# Patient Record
Sex: FEMALE | Race: WHITE | NOT HISPANIC OR LATINO | Employment: OTHER | URBAN - METROPOLITAN AREA
[De-identification: names, ages, dates, MRNs, and addresses within clinical notes are randomized per-mention and may not be internally consistent; named-entity substitution may affect disease eponyms.]

---

## 2017-05-22 ENCOUNTER — HOSPITAL ENCOUNTER (OUTPATIENT)
Dept: MAMMOGRAPHY | Facility: CLINIC | Age: 62
Discharge: HOME | End: 2017-05-22
Attending: OBSTETRICS & GYNECOLOGY | Admitting: OBSTETRICS & GYNECOLOGY
Payer: COMMERCIAL

## 2017-05-22 DIAGNOSIS — Z12.31 VISIT FOR SCREENING MAMMOGRAM: ICD-10-CM

## 2017-05-22 PROCEDURE — G0202 SCR MAMMO BI INCL CAD: HCPCS

## 2017-10-16 ENCOUNTER — OFFICE VISIT (OUTPATIENT)
Dept: DERMATOLOGY | Facility: CLINIC | Age: 62
End: 2017-10-16

## 2017-10-16 VITALS — DIASTOLIC BLOOD PRESSURE: 82 MMHG | HEART RATE: 76 BPM | SYSTOLIC BLOOD PRESSURE: 128 MMHG

## 2017-10-16 DIAGNOSIS — L64.9 ANDROGENETIC ALOPECIA: Primary | ICD-10-CM

## 2017-10-16 DIAGNOSIS — L66.10 LICHEN PLANO-PILARIS: ICD-10-CM

## 2017-10-16 DIAGNOSIS — L40.3 PALMOPLANTAR PUSTULAR PSORIASIS: ICD-10-CM

## 2017-10-16 RX ORDER — KETOCONAZOLE 20 MG/ML
SHAMPOO TOPICAL DAILY PRN
Qty: 120 ML | Refills: 3 | Status: SHIPPED | OUTPATIENT
Start: 2017-10-16 | End: 2022-11-06

## 2017-10-16 ASSESSMENT — PAIN SCALES - GENERAL: PAINLEVEL: NO PAIN (0)

## 2017-10-16 NOTE — LETTER
"10/16/2017       RE: Maddie Sparks  [address] [facility] [address]     Dear Colleague,    Thank you for referring your patient, Maddie Sparks, to the [facility]MAT[facility]. Please see a copy of my visit note below.    [facility] Dermatology Note      Dermatology Problem List:  1. Lichen planopilaris.   2. Androgenetic alopecia.  3. Pustular hand psoriasis.   4. Nummular dermatitis.   5. Retention hyperkeratosis on scalp  6. Actinic keratosis s/p cryotherapy  7. Occular rosacea, treated by optometry  8. Red papules on forehead, unknown etiology (patient reports they respond to steroids)  7. \"Precancer\" on back, removed by outside dermatologist.    8. Patch testing at [facility], indicating allergy to oak hawkins and octyl gallate    Encounter Date: Oct 16, 2017    CC:  Chief Complaint   Patient presents with     Hair Loss     Maddie comes to clinic today for Androgenetic alopecia with LPP. States \"it's worse.\"         History of Present Illness:  Ms. Maddie Sparks is a 61 year old female who presents as a follow-up for hair loss. At her last visit, we recommended  Free and Clear shampoo and laser hair comb 3x/week. Patient recently purchased the laser hair comb but still has not used it as she fears that it would make her hair loss worse as she previously went to \"[facility] and triend a laser therapy there that she feels resulted in increased hair loss (MP80?). Her treatment at this time is the Free and Clear shampoo and vitamins but she thinks that things are getting worse. Patient reports mild pruritus of scalp, no burning or pain. She is wondering why she gets so much scaling on the scalp. She reports washing the whole head 1x/wk (done with styling at [facility]) and her bangs 2x/wk with the free and clear. She reports that she occasionally gets more prominent silvery scales on her scalp as well though she doesn not " have any today. She does have a history of pustular hand psoriasis.  Patient does report that the bumps on her forehead go away when she gets ILK-10 injection in her scalp She has had not significant changes to medical history since last visit.     Past Medical History:   Patient Active Problem List   Diagnosis     Seborrheic dermatitis     Acne     Alopecia     Chronic dermatitis of hands     Lichen plano-pilaris     Actinic keratosis     Inflamed seborrheic keratosis     Past Medical History:   Diagnosis Date     Hypothyroidism      Past Surgical History:   Procedure Laterality Date     NO HISTORY OF SURGERY  7/15/13    derm       Social History:  The patient works as a business owner. Lives in [place].    Family History:  Patient reports no family history of skin cancer (despite previous note indicates family history of non-melanoma skin cancer)    Medications:  Current Outpatient Prescriptions   Medication Sig Dispense Refill     Coenzyme Q10 (COQ10) 100 MG CAPS Take 1 capsule by mouth daily       vitamin  B complex with vitamin C (STRESS TAB) TABS Take 1 tablet by mouth daily       Multiple Vitamins-Minerals (MULTIVITAMIN ADULT PO) Take 1 tablet by mouth daily       clobetasol (TEMOVATE) 0.05 % ointment Apply topically At Bedtime Apply to hands at bedtime for two weeks, then taper to once weekly. 45 g 2     UNKNOWN TO PATIENT Cream for eczema on legs       atorvastatin (LIPITOR) 10 MG tablet Take 10 mg by mouth every other day       levothyroxine (SYNTHROID) 88 MCG tablet Take  by mouth daily. Take between sunday through thursday       levothyroxine (SYNTHROID) 100 MCG tablet Take  by mouth daily. Take Friday and Saturday         cyclobenzaprine (FLEXERIL) 10 MG tablet Take 10 mg by mouth daily.       calcium carbonate-vitamin D (CALCIUM 500 + D) 500-400 MG-UNIT TABS Take 1 tablet by mouth daily.       acetaminophen-codeine (TYLENOL #3) 300-30 MG per tablet Take 1 tablet by mouth as needed.       Allergies    Allergen Reactions     Amoxicillin Difficulty breathing     Ampicillin Sodium Difficulty breathing         Review of Systems:  -Constitutional: Positive for fatigue. The patient denies fatigue, fevers, chills, unintended weight loss, and night sweats.  -Skin: As above in HPI. No additional skin concerns.    Physical exam:  Vitals: /82 (BP Location: Left arm, Patient Position: Sitting, Cuff Size: Adult Regular)  Pulse 76  GEN: This is a well developed, well-nourished female in no acute distress, in a pleasant mood.    SKIN: Focused examination of the scalp, face, and hands was performed.  - scalp: sparse fine fiber growth at 1 - 2 cm, 3-4 cm, and 6-8 cm, all levels with broken hair.  - scalp: stable hair loss over vertex and improved right patietal scalp, perifollicular erythema and scale, no diffuse erythema  - postauricular area, bilateral: small pink plaques with scale.  - palmar hands: small white papules with diffuse thickened yellow hyperkeratosis  - No other lesions of concern on areas examined.     Impression/Plan:  1. Androgenetic alopecia: Overall hair density appears to have gotten worse since previous visit on 5/23/2016. However, her only treatment at over the interim has been Free and Clear shampoo. We will also get some labs today to rule out secondary causes of hair loss.    Testosterone free (mildly elevated at 0.54) and total (WNL) and DHEA-S (WNL) - repeat today    Patient was encouraged to start using the Laser comb 3/week    Discussed good hair practices including styling and products (lower heat, less pulling and friction)    Discussed possibly starting spironolactone, pt considering but hesitant about PO meds    2. Lichen plano pilaris: LPPAI score today was 1 due to pruritus and perifollicular scale and erythema. We discussed the important of starting treatment as described above.    2% Ketoconazole fragrance free shampoo    Discussed PRP   Kenalog intralesional injection procedure  note: After verbal consent and discussion of risks including but not limited to atrophy, pain, and bruising, time out was performed, the patient underwent positioning and the area was prepped with isopropyl alcohol, 2 total cc of Kenalog 10mg/cc was injected into 20 sites on the superior parietal and vertex scalp.  The patient tolerated the procedure well and left the Dermatology clinic in good condition.      3. Pustular hand dermatitis    Continue clobetasol as needed    Recommended vaseline under occlusion at night and generous moisturizing during the day     Follow-up in 3 months, earlier for new or changing lesions.     Staffed by Dr. Campbell    Staff Involved:  Resident (Carol Jorge MD) / Staff (as above)      Patient was seen and examined with the dermatology resident. I agree with the history, review of systems, physical examination, assessments and plan. ILK injections were done together.     Mary Campbell MD  Professor and  Chair  Department of Dermatology  [facility]        Pictures taken of patient today to be placed in chart for future reference.      Again, thank you for allowing me to participate in the care of your patient.      Sincerely,    Mary Campbell MD

## 2017-10-16 NOTE — MR AVS SNAPSHOT
After Visit Summary   10/16/2017    Maddie Sparks    MRN: [id number]           Patient Information     Date Of Birth          1955        Visit Information        Provider Department      10/16/2017 8:00 AM Mary Campbell MD [facility] Dermatology        Today's Diagnoses     Androgenetic alopecia    -  1    Lichen plano-pilaris        Palmoplantar pustular psoriasis           Follow-ups after your visit        Follow-up notes from your care team     Return in about 3 months (around 2018).      Your next 10 appointments already scheduled     2018  8:00 AM CST   (Arrive by 7:45 AM)   RETURN HAIRLOSS with Mary Campbell MD   [facility] Dermatology ([facility])    [address] [place] [address] [phone]              Who to contact     Please call your clinic at [phone] to:    Ask questions about your health    Make or cancel appointments    Discuss your medicines    Learn about your test results    Speak to your doctor   If you have compliments or concerns about an experience at your clinic, or if you wish to file a complaint, please contact [facility] Patient Relations at [phone] or email us at [email or web address]         Additional Information About Your Visit        MyChart Information     [employer] is an electronic gateway that provides easy, online access to your medical records. With [employer], you can request a clinic appointment, read your test results, renew a prescription or communicate with your care team.     To sign up for [employer] visit the website at [email or web address]   You will be asked to enter the access code listed below, as well as some personal information. Please follow the directions to create your username and password.     Your access code is: [email or web address]  Expires: 2018 12:42 AM     Your access code will  in 90 days.  If you need help or a new code, please contact your [facility] or call [phone] for assistance.        Care EveryWhere ID     This is your Care EveryWhere ID. This could be used by other organizations to access your [place] medical records  [id number]        Your Vitals Were     Pulse                   76            Blood Pressure from Last 3 Encounters:   10/16/17 128/82   08/29/16 119/80   05/23/16 128/85    Weight from Last 3 Encounters:   08/29/16 57 kg (125 lb 11.2 oz)   05/23/16 56.3 kg (124 lb 3.2 oz)   02/22/16 58.1 kg (128 lb)              We Performed the Following     INJECTION INTO SKIN LESIONS >7          Today's Medication Changes          These changes are accurate as of: 10/16/17 11:59 PM.  If you have any questions, ask your nurse or doctor.               Start taking these medicines.        Dose/Directions    ketoconazole 2 % shampoo   Commonly known as:  NIZORAL   Used for:  Lichen plano-pilaris   Started by:  Mary Campbell MD        Apply topically daily as needed for itching or irritation   Quantity:  120 mL   Refills:  3       triamcinolone acetonide 10 MG/ML injection   Commonly known as:  KENALOG   Used for:  Lichen plano-pilaris   Started by:  Mary Campbell MD        Dose:  10 mg   Inject 1 mL (10 mg) into the skin once for 1 dose   Quantity:  2 mL   Refills:  0            Where to get your medicines      These medications were sent to [facility] [address] [place] [address] AT [facility] [address] [address], [facility] [address]    Hours:  24-hours Phone:  [phone]     ketoconazole 2 % shampoo         Some of these will need a paper prescription and others can be bought over the counter.  Ask your nurse if you have questions.     You don't need a prescription for these medications     triamcinolone acetonide 10 MG/ML injection                Primary Care Provider Office Phone  # Fax #    Maddie Karlie Wiley [phone]       [facility] [address] [address]        Equal Access to Services     JOSE GONCALVES AH: Hadii latia ku hadkavino Soomaali, waaxda luqadaha, qaybta kaalmada adeegyada, oscar hardyn simonerich fisher laHéctorvirginia aponte. So [facility] [phone].    ATENCIÓN: Si habla español, tiene a zheng disposición servicios gratuitos de asistencia lingüística. Llame al [phone].    We comply with applicable federal civil rights laws and [place] laws. We do not discriminate on the basis of race, color, national origin, age, disability, sex, sexual orientation, or gender identity.            Thank you!     Thank you for choosing [facility] DERMATOLOGY  for your care. Our goal is always to provide you with excellent care. Hearing back from our patients is one way we can continue to improve our services. Please take a few minutes to complete the written survey that you may receive in the mail after your visit with us. Thank you!             Your Updated Medication List - Protect others around you: Learn how to safely use, store and throw away your medicines at www.disposemymeds.org.          This list is accurate as of: 10/16/17 11:59 PM.  Always use your most recent med list.                   Brand Name Dispense Instructions for use Diagnosis    acetaminophen-codeine 300-30 MG per tablet    TYLENOL #3     Take 1 tablet by mouth as needed.        calcium 500 + D 500-400 MG-UNIT Tabs per tablet   Generic drug:  calcium carbonate-vitamin D      Take 1 tablet by mouth daily.        clobetasol 0.05 % ointment    TEMOVATE    45 g    Apply topically At Bedtime Apply to hands at bedtime for two weeks, then taper to once weekly.    Localized pustular psoriasis       CoQ10 100 MG Caps      Take 1 capsule by mouth daily        FLEXERIL 10 MG tablet   Generic drug:  cyclobenzaprine      Take 10 mg by mouth daily.        ketoconazole 2 % shampoo    NIZORAL     120 mL    Apply topically daily as needed for itching or irritation    Lichen plano-pilaris       LIPITOR 10 MG tablet   Generic drug:  atorvastatin      Take 10 mg by mouth every other day        MULTIVITAMIN ADULT PO      Take 1 tablet by mouth daily        * SYNTHROID 88 MCG tablet   Generic drug:  levothyroxine      Take  by mouth daily. Take between sunday through thursday        * SYNTHROID 100 MCG tablet   Generic drug:  levothyroxine      Take  by mouth daily. Take Friday and Saturday        triamcinolone acetonide 10 MG/ML injection    KENALOG    2 mL    Inject 1 mL (10 mg) into the skin once for 1 dose    Lichen plano-pilaris       UNKNOWN TO PATIENT      Cream for eczema on legs        vitamin B complex with vitamin C Tabs tablet      Take 1 tablet by mouth daily        * Notice:  This list has 2 medication(s) that are the same as other medications prescribed for you. Read the directions carefully, and ask your doctor or other care provider to review them with you.

## 2017-10-16 NOTE — PROGRESS NOTES
"[facility] Dermatology Note      Dermatology Problem List:  1. Lichen planopilaris.   2. Androgenetic alopecia.  3. Pustular hand psoriasis.   4. Nummular dermatitis.   5. Retention hyperkeratosis on scalp  6. Actinic keratosis s/p cryotherapy  7. Occular rosacea, treated by optometry  8. Red papules on forehead, unknown etiology (patient reports they respond to steroids)  7. \"Precancer\" on back, removed by outside dermatologist.    8. Patch testing at [facility], indicating allergy to oak hawkins and octyl gallate    Encounter Date: Oct 16, 2017    CC:  Chief Complaint   Patient presents with     Hair Loss     Maddie comes to clinic today for Androgenetic alopecia with LPP. States \"it's worse.\"         History of Present Illness:  Ms. Maddie Sparks is a 61 year old female who presents as a follow-up for hair loss. At her last visit, we recommended  Free and Clear shampoo and laser hair comb 3x/week. Patient recently purchased the laser hair comb but still has not used it as she fears that it would make her hair loss worse as she previously went to \"[facility] and triend a laser therapy there that she feels resulted in increased hair loss (MP80?). Her treatment at this time is the Free and Clear shampoo and vitamins but she thinks that things are getting worse. Patient reports mild pruritus of scalp, no burning or pain. She is wondering why she gets so much scaling on the scalp. She reports washing the whole head 1x/wk (done with styling at [facility]) and her bangs 2x/wk with the free and clear. She reports that she occasionally gets more prominent silvery scales on her scalp as well though she doesn not have any today. She does have a history of pustular hand psoriasis.  Patient does report that the bumps on her forehead go away when she gets ILK-10 injection in her scalp She has had not significant changes to medical history since last visit.     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    Family History:  Patient reports no family history of skin cancer (despite previous note indicates family history of non-melanoma skin cancer)    Medications:  Current Outpatient Prescriptions   Medication Sig Dispense Refill     Coenzyme Q10 (COQ10) 100 MG CAPS Take 1 capsule by mouth daily       vitamin  B complex with vitamin C (STRESS TAB) TABS Take 1 tablet by mouth daily       Multiple Vitamins-Minerals (MULTIVITAMIN ADULT PO) Take 1 tablet by mouth daily       clobetasol (TEMOVATE) 0.05 % ointment Apply topically At Bedtime Apply to hands at bedtime for two weeks, then taper to once weekly. 45 g 2     UNKNOWN TO PATIENT Cream for eczema on legs       atorvastatin (LIPITOR) 10 MG tablet Take 10 mg by mouth every other day       levothyroxine (SYNTHROID) 88 MCG tablet Take  by mouth daily. Take between sunday through thursday       levothyroxine (SYNTHROID) 100 MCG tablet Take  by mouth daily. Take Friday and Saturday         cyclobenzaprine (FLEXERIL) 10 MG tablet Take 10 mg by mouth daily.       calcium carbonate-vitamin D (CALCIUM 500 + D) 500-400 MG-UNIT TABS Take 1 tablet by mouth daily.       acetaminophen-codeine (TYLENOL #3) 300-30 MG per tablet Take 1 tablet by mouth as needed.       Allergies   Allergen Reactions     Amoxicillin Difficulty breathing     Ampicillin Sodium Difficulty breathing         Review of Systems:  -Constitutional: Positive for fatigue. The patient denies fatigue, fevers, chills, unintended weight loss, and night sweats.  -Skin: As above in HPI. No  additional skin concerns.    Physical exam:  Vitals: /82 (BP Location: Left arm, Patient Position: Sitting, Cuff Size: Adult Regular)  Pulse 76  GEN: This is a well developed, well-nourished female in no acute distress, in a pleasant mood.    SKIN: Focused examination of the scalp, face, and hands was performed.  - scalp: sparse fine fiber growth at 1 - 2 cm, 3-4 cm, and 6-8 cm, all levels with broken hair.  - scalp: stable hair loss over vertex and improved right patietal scalp, perifollicular erythema and scale, no diffuse erythema  - postauricular area, bilateral: small pink plaques with scale.  - palmar hands: small white papules with diffuse thickened yellow hyperkeratosis  - No other lesions of concern on areas examined.     Impression/Plan:  1. Androgenetic alopecia: Overall hair density appears to have gotten worse since previous visit on 5/23/2016. However, her only treatment at over the interim has been Free and Clear shampoo. We will also get some labs today to rule out secondary causes of hair loss.    Testosterone free (mildly elevated at 0.54) and total (WNL) and DHEA-S (WNL) - repeat today    Patient was encouraged to start using the Laser comb 3/week    Discussed good hair practices including styling and products (lower heat, less pulling and friction)    Discussed possibly starting spironolactone, pt considering but hesitant about PO meds    2. Lichen plano pilaris: LPPAI score today was 1 due to pruritus and perifollicular scale and erythema. We discussed the important of starting treatment as described above.    2% Ketoconazole fragrance free shampoo    Discussed PRP   Kenalog intralesional injection procedure note: After verbal consent and discussion of risks including but not limited to atrophy, pain, and bruising, time out was performed, the patient underwent positioning and the area was prepped with isopropyl alcohol, 2 total cc of Kenalog 10mg/cc was injected into 20 sites on the  superior parietal and vertex scalp.  The patient tolerated the procedure well and left the Dermatology clinic in good condition.      3. Pustular hand dermatitis    Continue clobetasol as needed    Recommended vaseline under occlusion at night and generous moisturizing during the day     Follow-up in 3 months, earlier for new or changing lesions.     Staffed by Dr. Campbell    Staff Involved:  Resident (Carol Jorge MD) / Staff (as above)      Patient was seen and examined with the dermatology resident. I agree with the history, review of systems, physical examination, assessments and plan. ILK injections were done together.     Mary Campbell MD  Professor and  Chair  Department of Dermatology  [facility]

## 2018-03-19 ENCOUNTER — OFFICE VISIT (OUTPATIENT)
Dept: DERMATOLOGY | Facility: CLINIC | Age: 63
End: 2018-03-19
Payer: COMMERCIAL

## 2018-03-19 VITALS — SYSTOLIC BLOOD PRESSURE: 130 MMHG | DIASTOLIC BLOOD PRESSURE: 84 MMHG | HEART RATE: 72 BPM

## 2018-03-19 DIAGNOSIS — L40.8 LOCALIZED PUSTULAR PSORIASIS: ICD-10-CM

## 2018-03-19 DIAGNOSIS — L65.9 LOSS OF HAIR: Primary | ICD-10-CM

## 2018-03-19 DIAGNOSIS — L66.10 LICHEN PLANO-PILARIS: ICD-10-CM

## 2018-03-19 DIAGNOSIS — L40.9 PSORIASIS: ICD-10-CM

## 2018-03-19 DIAGNOSIS — L64.9 ANDROGENETIC ALOPECIA: ICD-10-CM

## 2018-03-19 RX ORDER — CALCIPOTRIENE 50 UG/G
OINTMENT TOPICAL
Qty: 90 G | Refills: 1 | Status: SHIPPED | OUTPATIENT
Start: 2018-03-19 | End: 2022-11-13

## 2018-03-19 RX ORDER — KETOCONAZOLE 20 MG/ML
SHAMPOO TOPICAL DAILY PRN
Qty: 120 ML | Refills: 3 | Status: SHIPPED | OUTPATIENT
Start: 2018-03-19 | End: 2022-11-13

## 2018-03-19 RX ORDER — CLOBETASOL PROPIONATE 0.5 MG/G
OINTMENT TOPICAL AT BEDTIME
Qty: 45 G | Refills: 2 | Status: SHIPPED | OUTPATIENT
Start: 2018-03-19 | End: 2022-11-13

## 2018-03-19 ASSESSMENT — PAIN SCALES - GENERAL: PAINLEVEL: NO PAIN (0)

## 2018-03-19 NOTE — NURSING NOTE
Drug Administration Record    Drug Name: triamcinolone acetonide(kenalog)  Dose: 2mL of triamcinolone 10mg/mL, 20mg dose  Route administered: IM  NDC #: Kenalog-10 ([address]-96)  Amount of waste(mL):3   Reason for waste: Single use vial

## 2018-03-19 NOTE — MR AVS SNAPSHOT
After Visit Summary   3/19/2018    Maddie Sparks    MRN: [id number]           Patient Information     Date Of Birth          1955        Visit Information        Provider Department      3/19/2018 7:30 AM Mary Campbell MD [facility] Dermatology        Today's Diagnoses     Loss of hair    -  1    Psoriasis          Care Instructions    For your scalp:    Start ketoconazole shampoo every time you wash your hair  Start laser hair comb 3x/week  Purchase Rogaine mens strength foam to apply 2x/day  Ward plan to start PRP    For your hands:    Clobetasol ointment every other day twice a day  On the other days calcipotriene ointment twice a day          Follow-ups after your visit        Follow-up notes from your care team     Return in about 4 months (around 7/19/2018).      Who to contact     Please call your clinic at [phone] to:    Ask questions about your health    Make or cancel appointments    Discuss your medicines    Learn about your test results    Speak to your doctor            Additional Information About Your Visit        Care EveryWhere ID     This is your Care EveryWhere ID. This could be used by other organizations to access your [place] medical records  [id number]        Your Vitals Were     Pulse                   72            Blood Pressure from Last 3 Encounters:   03/19/18 130/84   10/16/17 128/82   08/29/16 119/80    Weight from Last 3 Encounters:   08/29/16 57 kg (125 lb 11.2 oz)   05/23/16 56.3 kg (124 lb 3.2 oz)   02/22/16 58.1 kg (128 lb)              Today, you had the following     No orders found for display       Primary Care Provider Office Phone # Fax #    Maddie Karlie Wiley [phone]       [facility] [address]  [facility] [place] [address]        Equal Access to Services     JOSE GONCALVES : Coral Velasco, erica zaman, dori montgomery, oscar aponte.  [facility] [phone].    ATENCIÓN: Si maureen fitzpatrick, tiene a zheng disposición servicios gratuitos de asistencia lingüística. Franck denson [phone].    We comply with applicable federal civil rights laws and [place] laws. We do not discriminate on the basis of race, color, national origin, age, disability, sex, sexual orientation, or gender identity.            Thank you!     Thank you for choosing [facility] DERMATOLOGY  for your care. Our goal is always to provide you with excellent care. Hearing back from our patients is one way we can continue to improve our services. Please take a few minutes to complete the written survey that you may receive in the mail after your visit with us. Thank you!             Your Updated Medication List - Protect others around you: Learn how to safely use, store and throw away your medicines at www.disposemymeds.org.          This list is accurate as of 3/19/18  8:42 AM.  Always use your most recent med list.                   Brand Name Dispense Instructions for use Diagnosis    acetaminophen-codeine 300-30 MG per tablet    TYLENOL #3     Take 1 tablet by mouth as needed.        calcium 500 + D 500-400 MG-UNIT Tabs per tablet   Generic drug:  calcium carbonate-vitamin D      Take 1 tablet by mouth daily.        clobetasol 0.05 % ointment    TEMOVATE    45 g    Apply topically At Bedtime Apply to hands at bedtime for two weeks, then taper to once weekly.    Localized pustular psoriasis       CoQ10 100 MG Caps      Take 1 capsule by mouth daily        FLEXERIL 10 MG tablet   Generic drug:  cyclobenzaprine      Take 10 mg by mouth daily.        ketoconazole 2 % shampoo    NIZORAL    120 mL    Apply topically daily as needed for itching or irritation    Lichen plano-pilaris       LIPITOR 10 MG tablet   Generic drug:  atorvastatin      Take 10 mg by mouth every other day        MULTIVITAMIN ADULT PO      Take 1 tablet by mouth daily        * SYNTHROID 88 MCG tablet   Generic drug:   levothyroxine      Take  by mouth daily. Take between sunday through thursday        * SYNTHROID 100 MCG tablet   Generic drug:  levothyroxine      Take  by mouth daily. Take Friday and Saturday        UNKNOWN TO PATIENT      Cream for eczema on legs        vitamin B complex with vitamin C Tabs tablet      Take 1 tablet by mouth daily        * Notice:  This list has 2 medication(s) that are the same as other medications prescribed for you. Read the directions carefully, and ask your doctor or other care provider to review them with you.

## 2018-03-19 NOTE — PATIENT INSTRUCTIONS
For your scalp:    Start ketoconazole shampoo every time you wash your hair  Start laser hair comb 3x/week  Purchase Rogaine mens strength foam to apply 2x/day  Ward plan to start PRP    For your hands:    Clobetasol ointment every other day twice a day  On the other days calcipotriene ointment twice a day

## 2018-03-19 NOTE — NURSING NOTE
Dermatology Rooming Note    Maddie Sparks's goals for this visit include:   Chief Complaint   Patient presents with     Derm Problem     maddie is here for a hairloss follow up, states it's gotten worse since her last visit.         June Cloud LPN

## 2018-03-19 NOTE — LETTER
"3/19/2018       RE: Maddie Sparks  [address] [facility] [address]     Dear Colleague,    Thank you for referring your patient, Maddie Sparks, to the [facility] DERMATOLOGY at [facility]. Please see a copy of my visit note below.    [facility] Dermatology Note      Dermatology Problem List:  1. Lichen planopilaris.   2. Androgenetic alopecia.  3. Pustular hand psoriasis.   4. Nummular dermatitis.   5. Retention hyperkeratosis on scalp  6. Actinic keratosis s/p cryotherapy  7. Occular rosacea, treated by optometry  8. Red papules on forehead, unknown etiology (patient reports they respond to steroids)  7. \"Precancer\" on back, removed by outside dermatologist.    8. [place] [facility] [place] hawkins and octyl gallate       Encounter Date: Mar 19, 2018    CC:   Chief Complaint   Patient presents with     Derm Problem     maddie is here for a hairloss follow up, states it's gotten worse since her last visit.           History of Present Illness:  Ms. Maddie Sparks is a 62 year old female who presents as a follow-up for hairloss. The patient has both androgenetic alopecia and LPP. was last seen 10/16/17 when she was encuoraged to start the laser hair comb 3x/week. She also had ILK injections as well on the superior parietal and vertex scalp. She also has pustular hand dermatitis and uses clobetasol prn.    Since the last visit, she feels like her hair loss has gotten worse. She has not started the laser hair comb yet. She is worried that when she has done in office treatment with laser in the past, she had worsening hair loss, so she was hesistant to start the laser hair comb.   She hasn't been using the ketoconazole shampoo. She uses Free and Clear shampoo. She washes her bands 2x/week and washes her entire scalp 1x/week. She uses \"moose 1x/week\" when she goes to the salon. She is currently using a wig.  She has used Rogaine " "liquid in the past and did not like the \"oily\" feeling she had with it.  She mentions she has pruritus on the temporal areas of her scalp occasionally. There is no burning or pain symptoms with her scalp.  She also has increased scaliness of her hands. She does not apply the clobetasol ointment frequently, but this seems to help alleviate the symptoms of pain/itching when she does. She feels like the ILK injections actually help control her hand pustular psoriasis.      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     Family History:  Patient reports no family history of skin cancer (despite previous note indicates family history of non-melanoma skin cancer)       Medications:  Current Outpatient Prescriptions   Medication Sig Dispense Refill     ketoconazole (NIZORAL) 2 % shampoo Apply topically daily as needed for itching or irritation 120 mL 3     Coenzyme Q10 (COQ10) 100 MG CAPS Take 1 capsule by mouth daily       vitamin  B complex with vitamin C (STRESS TAB) TABS Take 1 tablet by mouth daily       Multiple Vitamins-Minerals (MULTIVITAMIN ADULT PO) Take 1 tablet by mouth daily       clobetasol (TEMOVATE) 0.05 % ointment Apply topically At Bedtime Apply to hands at bedtime for two weeks, then taper to once weekly. 45 g 2     UNKNOWN TO PATIENT Cream for eczema on legs       atorvastatin (LIPITOR) 10 MG tablet Take 10 mg by mouth every other day       levothyroxine (SYNTHROID) 88 MCG tablet Take  by mouth daily. Take between sunday through thursday       levothyroxine (SYNTHROID) 100 MCG tablet " Take  by mouth daily. Take Friday and Saturday         cyclobenzaprine (FLEXERIL) 10 MG tablet Take 10 mg by mouth daily.       acetaminophen-codeine (TYLENOL #3) 300-30 MG per tablet Take 1 tablet by mouth as needed.       calcium carbonate-vitamin D (CALCIUM 500 + D) 500-400 MG-UNIT TABS Take 1 tablet by mouth daily.          Allergies   Allergen Reactions     Amoxicillin Difficulty breathing     Ampicillin Sodium Difficulty breathing         Review of Systems:  -As per HPI  -Constitutional: The patient reports fatigue, but denies fevers, chills, unintended weight loss, and night sweats.  -Skin: As above in HPI. No additional skin concerns.    Physical exam:  Vitals: /84 (BP Location: Right arm, Patient Position: Chair, Cuff Size: Adult Regular)  Pulse 72  GEN: This is a well developed, well-nourished female in no acute distress, in a pleasant mood.    SKIN: Focused examination of the head, scalp, and hands was performed.  -Thinning of hair in the fronto and temporal regions of the scalp  -Mild perifollicular erythema and follicular accentuation  -Increased perifollicular scale  -Hair pull test negative  -LPPAI score 0.67  -Forehead with pink scaly, greasy papules  -Palms of hands with scale, erythema, fissuring, and several pustules  -No other lesions of concern on areas examined.     Impression/Plan:  1. Androgenetic alopecia- She has stable disease activity as compared to last visit. She has not started the laser hair comb and have reassured the patient that she most likely not  have increased hair loss with starting the light therapy. In addition, recommended that she start the Rogaine foam to her scalp. She is hesitant to start spironolactone. She has a history of elevated free testosterone, so will recheck free and total testosterone, SHBG, and DHEA-S, as well as iron studies. She also has underlying seborrheic dermatitis, which will need to be in good control to improve her hair regrowth.    Ordered  free and total testosterone, SHBG, and DHEA-S    Ordered iron panel    Start Rogaine Mens Strength Foam BID    Start laser light hair comb 3x/week    Discussed spironolactone, she does not wish to start today but will keep conversation open for future visits    Plan to start PRP    Have sent 2 hair fibers for laboratory analysis    2. LPP- Relatively low disease activity today. The patient mentions some improvement in symptoms with ILK, as well as improvement in her pustular hand psoriasis. Have proceeded to perform ILK. LPPAI score today is 0.67.    Kenalog intralesional injection procedure note: After verbal consent and discussion of risks including but not limited to atrophy, pain, and bruising, time out was performed, the patient underwent positioning and the area was prepped with isopropyl alcohol, 2 total cc of Kenalog 10 mg/cc was injected into 20 sites on the frontal and temporal scalp.  The patient tolerated the procedure well and left the Dermatology clinic in good condition.      3. Seborrheic dermatitis- Moderate disease activity, involving her forehead and scalp.    Start 2%  ketoconazole shampoo every time she washes her hair    4. Hand pustular psoriasis- Encouraged more frequent use of topical steroid. She describes improvement in symptoms when she uses clobetasol on occasion. She also describes a history of nummular dermatitis, and discussed she could use topical steroid for brief period of a few days to eczematous patches when she gets a flare.    Use clobetasol 0.05% ointment BID every other day for 2-3 weeks, then can taper to once a day every other day, then can decrease to once a week as prior    Start calcipotriene ointment BID every other day    CC Dr. Campbell on close of this encounter.  Follow-up in 4 months, earlier for new or changing lesions.       Dr. Campbell staffed the patient.    Staff Involved:  Resident(Susana Gonzalez)/Staff(as above)      Patient was seen and examined  with the medicine/dermatology resident. I agree with the history, review of systems, physical examination, assessments and plan. ILK injections were done together.     Mary Campbell MD  Professor and  Chair  Department of Dermatology  [facility]        Pictures were placed in Pt's chart today for future reference.      Again, thank you for allowing me to participate in the care of your patient.      Sincerely,    Mary Campbell MD

## 2018-03-19 NOTE — PROGRESS NOTES
"[facility] Dermatology Note      Dermatology Problem List:  1. Lichen planopilaris.   2. Androgenetic alopecia.  3. Pustular hand psoriasis.   4. Nummular dermatitis.   5. Retention hyperkeratosis on scalp  6. Actinic keratosis s/p cryotherapy  7. Occular rosacea, treated by optometry  8. Red papules on forehead, unknown etiology (patient reports they respond to steroids)  7. \"Precancer\" on back, removed by outside dermatologist.    8. [place] [facility] [place] and octyl gallate       Encounter Date: Mar 19, 2018    CC:   Chief Complaint   Patient presents with     Derm Problem     maddie is here for a hairloss follow up, states it's gotten worse since her last visit.           History of Present Illness:  Ms. Maddie Sparks is a 62 year old female who presents as a follow-up for hairloss. The patient has both androgenetic alopecia and LPP. was last seen 10/16/17 when she was encuoraged to start the laser hair comb 3x/week. She also had ILK injections as well on the superior parietal and vertex scalp. She also has pustular hand dermatitis and uses clobetasol prn.    Since the last visit, she feels like her hair loss has gotten worse. She has not started the laser hair comb yet. She is worried that when she has done in office treatment with laser in the past, she had worsening hair loss, so she was hesistant to start the laser hair comb.   She hasn't been using the ketoconazole shampoo. She uses Free and Clear shampoo. She washes her bands 2x/week and washes her entire scalp 1x/week. She uses \"moose 1x/week\" when she goes to the salon. She is currently using a wig.  She has used Rogaine liquid in the past and did not like the \"oily\" feeling she had with it.  She mentions she has pruritus on the temporal areas of her scalp occasionally. There is no burning or pain symptoms with her scalp.  She also has increased scaliness of her hands. She does not apply the clobetasol " ointment frequently, but this seems to help alleviate the symptoms of pain/itching when she does. She feels like the ILK injections actually help control her hand pustular psoriasis.      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     Family History:  Patient reports no family history of skin cancer (despite previous note indicates family history of non-melanoma skin cancer)       Medications:  Current Outpatient Prescriptions   Medication Sig Dispense Refill     ketoconazole (NIZORAL) 2 % shampoo Apply topically daily as needed for itching or irritation 120 mL 3     Coenzyme Q10 (COQ10) 100 MG CAPS Take 1 capsule by mouth daily       vitamin  B complex with vitamin C (STRESS TAB) TABS Take 1 tablet by mouth daily       Multiple Vitamins-Minerals (MULTIVITAMIN ADULT PO) Take 1 tablet by mouth daily       clobetasol (TEMOVATE) 0.05 % ointment Apply topically At Bedtime Apply to hands at bedtime for two weeks, then taper to once weekly. 45 g 2     UNKNOWN TO PATIENT Cream for eczema on legs       atorvastatin (LIPITOR) 10 MG tablet Take 10 mg by mouth every other day       levothyroxine (SYNTHROID) 88 MCG tablet Take  by mouth daily. Take between sunday through thursday       levothyroxine (SYNTHROID) 100 MCG tablet Take  by mouth daily. Take Friday and Saturday         cyclobenzaprine (FLEXERIL) 10 MG tablet Take 10 mg by mouth daily.       acetaminophen-codeine (TYLENOL #3) 300-30 MG per tablet Take 1 tablet by mouth as needed.       calcium carbonate-vitamin D (CALCIUM 500 + D) 500-400 MG-UNIT  TABS Take 1 tablet by mouth daily.          Allergies   Allergen Reactions     Amoxicillin Difficulty breathing     Ampicillin Sodium Difficulty breathing         Review of Systems:  -As per HPI  -Constitutional: The patient reports fatigue, but denies fevers, chills, unintended weight loss, and night sweats.  -Skin: As above in HPI. No additional skin concerns.    Physical exam:  Vitals: /84 (BP Location: Right arm, Patient Position: Chair, Cuff Size: Adult Regular)  Pulse 72  GEN: This is a well developed, well-nourished female in no acute distress, in a pleasant mood.    SKIN: Focused examination of the head, scalp, and hands was performed.  -Thinning of hair in the fronto and temporal regions of the scalp  -Mild perifollicular erythema and follicular accentuation  -Increased perifollicular scale  -Hair pull test negative  -LPPAI score 0.67  -Forehead with pink scaly, greasy papules  -Palms of hands with scale, erythema, fissuring, and several pustules  -No other lesions of concern on areas examined.     Impression/Plan:  1. Androgenetic alopecia- She has stable disease activity as compared to last visit. She has not started the laser hair comb and have reassured the patient that she most likely not  have increased hair loss with starting the light therapy. In addition, recommended that she start the Rogaine foam to her scalp. She is hesitant to start spironolactone. She has a history of elevated free testosterone, so will recheck free and total testosterone, SHBG, and DHEA-S, as well as iron studies. She also has underlying seborrheic dermatitis, which will need to be in good control to improve her hair regrowth.    Ordered free and total testosterone, SHBG, and DHEA-S    Ordered iron panel    Start Rogaine Mens Strength Foam BID    Start laser light hair comb 3x/week    Discussed spironolactone, she does not wish to start today but will keep conversation open for future visits    Plan to start  PRP    Have sent 2 hair fibers for laboratory analysis    2. LPP- Relatively low disease activity today. The patient mentions some improvement in symptoms with ILK, as well as improvement in her pustular hand psoriasis. Have proceeded to perform ILK. LPPAI score today is 0.67.    Kenalog intralesional injection procedure note: After verbal consent and discussion of risks including but not limited to atrophy, pain, and bruising, time out was performed, the patient underwent positioning and the area was prepped with isopropyl alcohol, 2 total cc of Kenalog 10 mg/cc was injected into 20 sites on the frontal and temporal scalp.  The patient tolerated the procedure well and left the Dermatology clinic in good condition.      3. Seborrheic dermatitis- Moderate disease activity, involving her forehead and scalp.    Start 2%  ketoconazole shampoo every time she washes her hair    4. Hand pustular psoriasis- Encouraged more frequent use of topical steroid. She describes improvement in symptoms when she uses clobetasol on occasion. She also describes a history of nummular dermatitis, and discussed she could use topical steroid for brief period of a few days to eczematous patches when she gets a flare.    Use clobetasol 0.05% ointment BID every other day for 2-3 weeks, then can taper to once a day every other day, then can decrease to once a week as prior    Start calcipotriene ointment BID every other day    CC Dr. Campbell on close of this encounter.  Follow-up in 4 months, earlier for new or changing lesions.       Dr. Campbell staffed the patient.    Staff Involved:  Resident(Susana Gonzalez)/Staff(as above)      Patient was seen and examined with the medicine/dermatology resident. I agree with the history, review of systems, physical examination, assessments and plan. ILK injections were done together.     Mary Campbell MD  Professor and  Chair  Department of Dermatology  [facility]

## 2018-03-28 DIAGNOSIS — L65.9 LOSS OF HAIR: ICD-10-CM

## 2018-03-28 LAB
FERRITIN SERPL-MCNC: 82 NG/ML (ref 8–252)
IRON SATN MFR SERPL: 32 % (ref 15–46)
IRON SERPL-MCNC: 103 UG/DL (ref 35–180)
TIBC SERPL-MCNC: 323 UG/DL (ref 240–430)

## 2018-03-28 PROCEDURE — 84403 ASSAY OF TOTAL TESTOSTERONE: CPT | Performed by: FAMILY MEDICINE

## 2018-03-28 PROCEDURE — 36415 COLL VENOUS BLD VENIPUNCTURE: CPT | Performed by: FAMILY MEDICINE

## 2018-03-28 PROCEDURE — 84270 ASSAY OF SEX HORMONE GLOBUL: CPT | Performed by: FAMILY MEDICINE

## 2018-03-28 PROCEDURE — 82728 ASSAY OF FERRITIN: CPT | Performed by: FAMILY MEDICINE

## 2018-03-28 PROCEDURE — 82627 DEHYDROEPIANDROSTERONE: CPT | Performed by: FAMILY MEDICINE

## 2018-03-28 PROCEDURE — 83540 ASSAY OF IRON: CPT | Performed by: FAMILY MEDICINE

## 2018-03-28 PROCEDURE — 83550 IRON BINDING TEST: CPT | Performed by: FAMILY MEDICINE

## 2018-03-29 LAB — DHEA-S SERPL-MCNC: 31 UG/DL (ref 35–430)

## 2018-04-02 ENCOUNTER — TELEPHONE (OUTPATIENT)
Dept: DERMATOLOGY | Facility: CLINIC | Age: 63
End: 2018-04-02

## 2018-04-02 NOTE — TELEPHONE ENCOUNTER
[facility] Call Center    Phone Message    May a detailed message be left on voicemail: yes    Reason for Call: Requesting Results   Name/type of test: Labs  Date of test: 3/28  Was test done at a location other than [facility] (Please fill in the location if not [facility])?: Yes: [place] in [place]      Action Taken: Message routed to:  [facility] ([facility]

## 2018-04-02 NOTE — TELEPHONE ENCOUNTER
I called and spoke with Maddie and explained that we were still waiting for her testosterone results to get back once they have been review she should receive the results.

## 2018-04-03 LAB
SHBG SERPL-SCNC: 59 NMOL/L (ref 30–135)
TESTOST FREE SERPL-MCNC: 0.48 NG/DL (ref 0.06–0.38)
TESTOST SERPL-MCNC: 40 NG/DL (ref 8–60)

## 2018-07-10 ENCOUNTER — ALLIED HEALTH/NURSE VISIT (OUTPATIENT)
Dept: NURSING | Facility: CLINIC | Age: 63
End: 2018-07-10
Payer: COMMERCIAL

## 2018-07-10 VITALS
SYSTOLIC BLOOD PRESSURE: 146 MMHG | OXYGEN SATURATION: 100 % | HEART RATE: 120 BPM | TEMPERATURE: 98.4 F | DIASTOLIC BLOOD PRESSURE: 94 MMHG

## 2018-07-10 DIAGNOSIS — T30.0 BURN: Primary | ICD-10-CM

## 2018-07-10 PROCEDURE — 99207 ZZC NO CHARGE NURSE ONLY: CPT
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
## 2018-07-16 ENCOUNTER — OFFICE VISIT (OUTPATIENT)
Dept: DERMATOLOGY | Facility: CLINIC | Age: 63
End: 2018-07-16
Payer: COMMERCIAL

## 2018-07-16 VITALS — HEART RATE: 73 BPM | DIASTOLIC BLOOD PRESSURE: 83 MMHG | SYSTOLIC BLOOD PRESSURE: 134 MMHG

## 2018-07-16 DIAGNOSIS — L40.3 PALMOPLANTAR PUSTULAR PSORIASIS: ICD-10-CM

## 2018-07-16 DIAGNOSIS — L43.9 LICHEN PLANUS: Primary | ICD-10-CM

## 2018-07-16 DIAGNOSIS — L64.9 ANDROGENETIC ALOPECIA: ICD-10-CM

## 2018-07-16 DIAGNOSIS — L21.9 DERMATITIS, SEBORRHEIC: ICD-10-CM

## 2018-07-16 RX ORDER — HYDROCORTISONE 2.5 %
CREAM (GRAM) TOPICAL
Qty: 30 G | Refills: 3 | Status: SHIPPED | OUTPATIENT
Start: 2018-07-16 | End: 2022-11-13

## 2018-07-16 RX ORDER — SPIRONOLACTONE 25 MG/1
TABLET ORAL
Qty: 60 TABLET | Refills: 3 | Status: SHIPPED | OUTPATIENT
Start: 2018-07-16 | End: 2022-11-13

## 2018-07-16 RX ORDER — KETOCONAZOLE 20 MG/G
CREAM TOPICAL
Qty: 30 G | Refills: 11 | Status: SHIPPED | OUTPATIENT
Start: 2018-07-16 | End: 2022-11-06

## 2018-07-16 ASSESSMENT — PAIN SCALES - GENERAL
PAINLEVEL: NO PAIN (0)
PAINLEVEL: MILD PAIN (2)

## 2018-07-16 NOTE — MR AVS SNAPSHOT
After Visit Summary   7/16/2018    Maddie Sparks    MRN: [id number]           Patient Information     Date Of Birth          1955        Visit Information        Provider Department      7/16/2018 8:00 AM Mary Campbell MD [facility] Dermatology        Today's Diagnoses     Lichen planus    -  1    Palmoplantar pustular psoriasis        Androgenetic alopecia        Dermatitis, seborrheic          Care Instructions        For the scaling on the face: Start ketoconazole and hydrocortisone creams twice daily as instructed. Limit hydrocortisone use to <1/2 the days of the month. Safe to use the keto cream daily long term.     For the hair-loss:   -Repeat injections today.   -Start spironolactone 25mg daily. If tolerating well, increase to 2 pills daily.   -Start minoxidil foam daily.   -    Avoid products with octyl gallate.           Follow-ups after your visit        Follow-up notes from your care team     Return in about 3 months (around 10/16/2018).      Your next 10 appointments already scheduled     Aug 13, 2018  7:00 AM CDT   MA SCREENING DIGITAL BILATERAL with SHBCMA2   [facility] [place] ([facility])    [address] [place] [address], [address] [place] [id number] [phone]           Do not use any powder, lotion or deodorant under your arms or on your breast. If you do, we will ask you to remove it before your exam.  Wear comfortable, two-piece clothing.  If you have any allergies, tell your care team.  Bring any previous mammograms from other facilities or have them mailed to the breast center. Three-dimensional (3D) mammograms are available at [place] locations in [facility], [facility], and [place]. [facility] locations include [facility] in [place]. Benefits of 3D mammograms include: - Improved rate of cancer detection - Decreases your chance of having to go  "back for more tests, which means fewer: - \"False-positive\" results (This means that there is an abnormal area but it isn't cancer.) - Invasive testing procedures, such as a biopsy or surgery - Can provide clearer images of the breast if you have dense breast tissue. 3D mammography is an optional exam that anyone can have with a 2D mammogram. It doesn't replace or take the place of a 2D mammogram. 2D mammograms remain an effective screening test for all women.  Not all insurance companies cover the cost of a 3D mammogram. Check with your insurance.            Oct 29, 2018  7:30 AM CDT   (Arrive by 7:15 AM)   RETURN HAIRLOSS with Mary Campbell MD   [facility] Dermatology ([facility] [place])    [address] [place] [address] [phone]              Who to contact     Please call your clinic at [phone] to:    Ask questions about your health    Make or cancel appointments    Discuss your medicines    Learn about your test results    Speak to your doctor            Additional Information About Your Visit        Care EveryWhere ID     This is your Care EveryWhere ID. This could be used by other organizations to access your [facility] medical records  [id number]        Your Vitals Were     Pulse                   73            Blood Pressure from Last 3 Encounters:   07/16/18 134/83   07/10/18 (!) 146/94   03/19/18 130/84    Weight from Last 3 Encounters:   08/29/16 57 kg (125 lb 11.2 oz)   05/23/16 56.3 kg (124 lb 3.2 oz)   02/22/16 58.1 kg (128 lb)              We Performed the Following     INJECTION INTO SKIN LESIONS >7          Today's Medication Changes          These changes are accurate as of 7/16/18  8:57 AM.  If you have any questions, ask your nurse or doctor.               Start taking these medicines.        Dose/Directions    hydrocortisone 2.5 % cream   Used for:  Dermatitis, seborrheic   Started by:  Mary Campbell MD        " Apply twice daily to the affected face for 1-2 weeks at a time for redness/scaling.   Quantity:  30 g   Refills:  3       spironolactone 25 MG tablet   Commonly known as:  ALDACTONE   Used for:  Androgenetic alopecia   Started by:  Mary Campbell MD        Take 1 tablet daily. If tolerating well, increase to 2 tablets daily.   Quantity:  60 tablet   Refills:  3       triamcinolone acetonide 10 MG/ML injection   Commonly known as:  KENALOG   Used for:  Lichen planus   Started by:  Mary Campbell MD        Dose:  10 mg   Inject 1 mL (10 mg) into the muscle once for 1 dose   Quantity:  1 mL   Refills:  0         These medicines have changed or have updated prescriptions.        Dose/Directions    * ketoconazole 2 % shampoo   Commonly known as:  NIZORAL   This may have changed:  Another medication with the same name was added. Make sure you understand how and when to take each.   Used for:  Lichen plano-pilaris   Changed by:  Mary Campbell MD        Apply topically daily as needed for itching or irritation   Quantity:  120 mL   Refills:  3       * ketoconazole 2 % shampoo   Commonly known as:  NIZORAL   This may have changed:  Another medication with the same name was added. Make sure you understand how and when to take each.   Used for:  Loss of hair   Changed by:  Mary Campbell MD        Apply topically daily as needed for itching or irritation   Quantity:  120 mL   Refills:  3       * ketoconazole 2 % cream   Commonly known as:  NIZORAL   This may have changed:  You were already taking a medication with the same name, and this prescription was added. Make sure you understand how and when to take each.   Used for:  Dermatitis, seborrheic   Changed by:  Mary Campbell MD        Apply twice daily to scaly areas on the face.   Quantity:  30 g   Refills:  11       * Notice:  This list has 3 medication(s) that are the same as other medications  prescribed for you. Read the directions carefully, and ask your doctor or other care provider to review them with you.         Where to get your medicines      These medications were sent to [facility]  [place], [place] - [address] [facility] [address]  [address], [place] [address]    Hours:  24-hours Phone:  [phone]     hydrocortisone 2.5 % cream    ketoconazole 2 % cream    spironolactone 25 MG tablet         Some of these will need a paper prescription and others can be bought over the counter.  Ask your nurse if you have questions.     You don't need a prescription for these medications     triamcinolone acetonide 10 MG/ML injection                Primary Care Provider Office Phone # Fax #    Maddie Karile Wiley [phone]       [facility] [address]  [facility] [address]        Equal Access to Services     JOSE GONCALVES AH: Hadii latia ku hadasho Soomaali, waaxda luqadaha, qaybta kaalmada adeegyada, waxay shaileshin hayvirginia townsend . So [facility] [phone].    ATENCIÓN: Si maureen fitzpatrick, tiene a zheng disposición servicios gratuitos de asistencia lingüística. Franck al [phone].    We comply with applicable federal civil rights laws and [place] laws. We do not discriminate on the basis of race, color, national origin, age, disability, sex, sexual orientation, or gender identity.            Thank you!     Thank you for choosing [facility] DERMATOLOGY  for your care. Our goal is always to provide you with excellent care. Hearing back from our patients is one way we can continue to improve our services. Please take a few minutes to complete the written survey that you may receive in the mail after your visit with us. Thank you!             Your Updated Medication List - Protect others around you: Learn how to safely use, store and throw away your medicines at www.disposemymeds.org.          This list is accurate as  of 7/16/18  8:57 AM.  Always use your most recent med list.                   Brand Name Dispense Instructions for use Diagnosis    acetaminophen-codeine 300-30 MG per tablet    TYLENOL #3     Take 1 tablet by mouth as needed.        calcipotriene 0.005 % Oint     90 g    Apply to hands twice a day every other day    Psoriasis       calcium 500 + D 500-400 MG-UNIT Tabs per tablet   Generic drug:  calcium carbonate-vitamin D      Take 1 tablet by mouth daily.        clobetasol 0.05 % ointment    TEMOVATE    45 g    Apply topically At Bedtime Apply to hands at twice a day every other day for two-three weeks, then taper to once a day every other day, then once a week as previously.    Localized pustular psoriasis       CoQ10 100 MG Caps      Take 1 capsule by mouth daily        FLEXERIL 10 MG tablet   Generic drug:  cyclobenzaprine      Take 10 mg by mouth daily.        hydrocortisone 2.5 % cream     30 g    Apply twice daily to the affected face for 1-2 weeks at a time for redness/scaling.    Dermatitis, seborrheic       * ketoconazole 2 % shampoo    NIZORAL    120 mL    Apply topically daily as needed for itching or irritation    Lichen plano-pilaris       * ketoconazole 2 % shampoo    NIZORAL    120 mL    Apply topically daily as needed for itching or irritation    Loss of hair       * ketoconazole 2 % cream    NIZORAL    30 g    Apply twice daily to scaly areas on the face.    Dermatitis, seborrheic       LIPITOR 10 MG tablet   Generic drug:  atorvastatin      Take 10 mg by mouth every other day        MULTIVITAMIN ADULT PO      Take 1 tablet by mouth daily        spironolactone 25 MG tablet    ALDACTONE    60 tablet    Take 1 tablet daily. If tolerating well, increase to 2 tablets daily.    Androgenetic alopecia       * SYNTHROID 88 MCG tablet   Generic drug:  levothyroxine      Take  by mouth daily. Take between sunday through thursday        * SYNTHROID 100 MCG tablet   Generic drug:  levothyroxine      Take  by  mouth daily. Take Friday and Saturday        triamcinolone acetonide 10 MG/ML injection    KENALOG    1 mL    Inject 1 mL (10 mg) into the muscle once for 1 dose    Lichen planus       UNKNOWN TO PATIENT      Cream for eczema on legs        vitamin B complex with vitamin C Tabs tablet      Take 1 tablet by mouth daily        * Notice:  This list has 5 medication(s) that are the same as other medications prescribed for you. Read the directions carefully, and ask your doctor or other care provider to review them with you.

## 2018-07-16 NOTE — NURSING NOTE
Dermatology Rooming Note    Maddie Sparks's goals for this visit include:   Chief Complaint   Patient presents with     Hair Loss     Maddie is visiting for a 4 month recheck related to Lichen planopilaris. Changes have worsened since the last visit.      Olesya Christian LPN

## 2018-07-16 NOTE — PROGRESS NOTES
"[facility] Dermatology Note      Dermatology Problem List:  1. Lichen planopilaris.   2. Androgenetic alopecia.  3. Pustular hand psoriasis.   4. Nummular dermatitis.   5. Retention hyperkeratosis on scalp  6. Actinic keratosis s/p cryotherapy  7. Occular rosacea, treated by optometry  8. Red papules on forehead, unknown etiology (patient reports they respond to steroids)  7. \"Precancer\" on back, removed by outside dermatologist.    8. Patch testing at [facility], indicating allergy to oak hawkins and octyl gallate       Encounter Date: Jul 16, 2018    CC:   Chief Complaint   Patient presents with     Hair Loss     Maddie is visiting for a 4 month recheck related to Lichen planopilaris. Changes have worsened since the last visit.          History of Present Illness:  Ms. Maddie Sparks is a 62 year old female who presents as a follow-up for hairloss. The patient has both androgenetic alopecia and LPP. Was last seen 13/19/18 when she had repeat ILK 2cc 10mg/cc injections performed. She had labs repeated that demonstrated a normal ferritin level.  Her free testosterone returned elevated at 0.48 with slightly low DHEAS. The plan at that time for her hairloss was as follows:     Start Rogaine Mens Strength Foam BID    Start laser light hair comb 3x/week    Discussed spironolactone, she does not wish to start today but will keep conversation open for future visits    Plan to start PRP    Start 2%  ketoconazole shampoo every time she washes her hair    Since last visit the patient notes that her hair loss has continued to progress and has not improved at all.  She continues to have pruritus over the bilateral parietal scalp.  Unfortunately, she has not used any of the treatments recommended for the hair loss since her last visit.  She never started Rogaine as she had concerns about interactions with her thyroid function and concern about the requirement to \"wash her hair every day\".  In the past she had used " the laser light treatment hair loss institute twice weekly though this was discontinued and never restarted due to concern that this worsened her hair loss.  She previously declined spironolactone and is quite unsure about oral treatments going forward.  She does have questions about a combination of minoxidil and spironolactone pill, however.  She brings with her a  printed document from online discussing this treatment.  No previous PRP.  Had concerns about starting ketoconazole shampoo given a hypothetical risk of hair loss noted on the package insert.      Patient has many questions today, including questions about hair on the arms, importance of washing her hair every day, and about her nails shape.  Psoriasis has been fairly stable overall.  Using clobetasol and moisturizers quite minimally.  Unable to tell us exactly how she is using the calcipotriene.      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     Family History:  Patient reports no family history of skin cancer (despite previous note indicates family history of non-melanoma skin cancer)       Medications:  Current Outpatient Prescriptions   Medication Sig Dispense Refill     atorvastatin (LIPITOR) 10 MG tablet Take 10 mg by mouth every other day       calcipotriene 0.005 % OINT Apply to hands twice a day every other day 90 g 1     calcium carbonate-vitamin D (CALCIUM 500 + D) 500-400 MG-UNIT TABS Take 1 tablet by mouth daily.       clobetasol (TEMOVATE) 0.05 % ointment Apply topically At Bedtime Apply to hands at twice a day every  other day for two-three weeks, then taper to once a day every other day, then once a week as previously. 45 g 2     Coenzyme Q10 (COQ10) 100 MG CAPS Take 1 capsule by mouth daily       cyclobenzaprine (FLEXERIL) 10 MG tablet Take 10 mg by mouth daily.       levothyroxine (SYNTHROID) 100 MCG tablet Take  by mouth daily. Take Friday and Saturday         levothyroxine (SYNTHROID) 88 MCG tablet Take  by mouth daily. Take between sunday through thursday       Multiple Vitamins-Minerals (MULTIVITAMIN ADULT PO) Take 1 tablet by mouth daily       acetaminophen-codeine (TYLENOL #3) 300-30 MG per tablet Take 1 tablet by mouth as needed.       ketoconazole (NIZORAL) 2 % shampoo Apply topically daily as needed for itching or irritation (Patient not taking: Reported on 7/16/2018) 120 mL 3     ketoconazole (NIZORAL) 2 % shampoo Apply topically daily as needed for itching or irritation (Patient not taking: Reported on 7/16/2018) 120 mL 3     UNKNOWN TO PATIENT Cream for eczema on legs       vitamin  B complex with vitamin C (STRESS TAB) TABS Take 1 tablet by mouth daily          Allergies   Allergen Reactions     Amoxicillin Difficulty breathing     Ampicillin Sodium Difficulty breathing         Review of Systems:  -As per HPI  -Constitutional: The patient reports fatigue, but denies fevers, chills, unintended weight loss, and night sweats.  -Skin: As above in HPI. No additional skin concerns.    Physical exam:  Vitals: /83  Pulse 73  GEN: This is a well developed, well-nourished female in no acute distress, in a pleasant mood.    SKIN: Focused examination of the head, scalp, and hands was performed.  -Prominent hinning of hair in the fronto and temporal regions of the scalp. Decreased density throughout.   -Slightly increased density of terminal hairs over the central vertex scalp today compared to the previous vist.   -Mild perifollicular scale, with minimal erythema overall. Mild inflammation over the posterior vertex.    -Fine baby fibers throughout.   -Hair pull test negative  -LPPAI score 0.67  -Forehead with pink scaly, greasy papules coalescent into plaques.   -Palms of hands with scale, erythema, fissuring, and several pustules  -Decreased density of hair fibers over the bilateral forearms. Few isolated long terminal brown hairs.   -No other lesions of concern on areas examined.     Impression/Plan:  1. Androgenetic alopecia- She has stable disease activity as compared to last visit. She has not started the laser hair comb or minoxidil. No previous spironolactone or hormone based therapy. Reviewed in detail the etiology, pathophysiology, associated conditions and treatment options today.  Discussed her previous lab results (free T 0.48, DHEAS 31) and significance. Adherence to treatment recommendations have been limiting to date. After thorough discussion, the following plan was made today:     Start Rogaine Mens Strength Foam BID    Start spironolactone 25mg daily. Will increase to 50mg daily if tolerating well.     Again, recommend consideration of  laser light hair comb 3x/week    2. LPP- Relatively low disease activity today. Ongoing pruritus overlying the bilateral parietal scalp with mild stigmata of active disease overlying the vertex. Have proceeded to perform ILK. LPPAI score today is 0.67.    Kenalog intralesional injection procedure note: After verbal consent and discussion of risks including but not limited to atrophy, pain, and bruising, time out was performed, the patient underwent positioning and the area was prepped with isopropyl alcohol, 2 total cc of Kenalog 10 mg/cc was injected into ~20 sites on the frontal, temporal and vertex scalp.  The patient tolerated the procedure well and left the Dermatology clinic in good condition.      3. Seborrheic dermatitis- Moderate-severe disease activity, involving her forehead and scalp. Some overlap with sebopsoriasis.     Start 2%  ketoconazole cream and  hydrocortisone 2.5% cream BID to the affected area. Will use steroid cream <1/2 days of month.     4. Hand pustular psoriasis- Encouraged more frequent use of topical steroid and moisturizer. Reviewed the importance of avoiding the Octyl gallate. Plan:     Continue BID PRN Clobetasol 0.05% ointment     Continue calcipotriene ointment BID PRN on mild days and as maintenance.     CC Dr. Campbell on close of this encounter.  Follow-up in 3 months, earlier for new or changing lesions.       Dr. Campbell staffed the patient.    Staff Involved:  Resident(Salty Tomlinson )/Staff(as above)    Salty Tomlinson MD  PGY4 Dermatology  [phone]     Patient was seen and examined with the dermatology resident. I agree with the history, review of systems, physical examination, assessments and plan. ILK injections were completed together. Over 50% of this 20 minute visit was spent in consultation and discussion of treatment approaches.    Mary Campbell MD  Professor and  Chair  Department of Dermatology  [facility]

## 2018-07-16 NOTE — LETTER
"7/16/2018       RE: Maddie Sparks  [id number] [address] [facility] [address] [facility] [id number]     Dear Colleague,    Thank you for referring your patient, Maddie Sparks, to the [facility] DERMATOLOGY at [facility]. Please see a copy of my visit note below.    [facility] Dermatology Note      Dermatology Problem List:  1. Lichen planopilaris.   2. Androgenetic alopecia.  3. Pustular hand psoriasis.   4. Nummular dermatitis.   5. Retention hyperkeratosis on scalp  6. Actinic keratosis s/p cryotherapy  7. Occular rosacea, treated by optometry  8. Red papules on forehead, unknown etiology (patient reports they respond to steroids)  7. \"Precancer\" on back, removed by outside dermatologist.    8. Patch testing at [facility], indicating allergy to oak hawkins and octyl gallate       Encounter Date: Jul 16, 2018    CC:   Chief Complaint   Patient presents with     Hair Loss     Maddie is visiting for a 4 month recheck related to Lichen planopilaris. Changes have worsened since the last visit.          History of Present Illness:  Ms. Maddie Sparks is a 62 year old female who presents as a follow-up for hairloss. The patient has both androgenetic alopecia and LPP. Was last seen 13/19/18 when she had repeat ILK 2cc 10mg/cc injections performed. She had labs repeated that demonstrated a normal ferritin level.  Her free testosterone returned elevated at 0.48 with slightly low DHEAS. The plan at that time for her hairloss was as follows:     Start Rogaine Mens Strength Foam BID    Start laser light hair comb 3x/week    Discussed spironolactone, she does not wish to start today but will keep conversation open for future visits    Plan to start PRP    Start 2%  ketoconazole shampoo every time she washes her hair    Since last visit the patient notes that her hair loss has continued to progress and has not improved at all.  She continues to have pruritus over the bilateral parietal scalp.  " "Unfortunately, she has not used any of the treatments recommended for the hair loss since her last visit.  She never started Rogaine as she had concerns about interactions with her thyroid function and concern about the requirement to \"wash her hair every day\".  In the past she had used the laser light treatment hair loss institute twice weekly though this was discontinued and never restarted due to concern that this worsened her hair loss.  She previously declined spironolactone and is quite unsure about oral treatments going forward.  She does have questions about a combination of minoxidil and spironolactone pill, however.  She brings with her a  printed document from online discussing this treatment.  No previous PRP.  Had concerns about starting ketoconazole shampoo given a hypothetical risk of hair loss noted on the package insert.      Patient has many questions today, including questions about hair on the arms, importance of washing her hair every day, and about her nails shape.  Psoriasis has been fairly stable overall.  Using clobetasol and moisturizers quite minimally.  Unable to tell us exactly how she is using the calcipotriene.      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     Family History:  Patient reports no family history of skin cancer (despite previous note indicates family history of non-melanoma skin cancer)       Medications:  Current Outpatient Prescriptions   Medication Sig Dispense Refill     atorvastatin (LIPITOR) 10 MG tablet Take 10 mg by mouth every other day       " calcipotriene 0.005 % OINT Apply to hands twice a day every other day 90 g 1     calcium carbonate-vitamin D (CALCIUM 500 + D) 500-400 MG-UNIT TABS Take 1 tablet by mouth daily.       clobetasol (TEMOVATE) 0.05 % ointment Apply topically At Bedtime Apply to hands at twice a day every other day for two-three weeks, then taper to once a day every other day, then once a week as previously. 45 g 2     Coenzyme Q10 (COQ10) 100 MG CAPS Take 1 capsule by mouth daily       cyclobenzaprine (FLEXERIL) 10 MG tablet Take 10 mg by mouth daily.       levothyroxine (SYNTHROID) 100 MCG tablet Take  by mouth daily. Take Friday and Saturday         levothyroxine (SYNTHROID) 88 MCG tablet Take  by mouth daily. Take between sunday through thursday       Multiple Vitamins-Minerals (MULTIVITAMIN ADULT PO) Take 1 tablet by mouth daily       acetaminophen-codeine (TYLENOL #3) 300-30 MG per tablet Take 1 tablet by mouth as needed.       ketoconazole (NIZORAL) 2 % shampoo Apply topically daily as needed for itching or irritation (Patient not taking: Reported on 7/16/2018) 120 mL 3     ketoconazole (NIZORAL) 2 % shampoo Apply topically daily as needed for itching or irritation (Patient not taking: Reported on 7/16/2018) 120 mL 3     UNKNOWN TO PATIENT Cream for eczema on legs       vitamin  B complex with vitamin C (STRESS TAB) TABS Take 1 tablet by mouth daily          Allergies   Allergen Reactions     Amoxicillin Difficulty breathing     Ampicillin Sodium Difficulty breathing         Review of Systems:  -As per HPI  -Constitutional: The patient reports fatigue, but denies fevers, chills, unintended weight loss, and night sweats.  -Skin: As above in HPI. No additional skin concerns.    Physical exam:  Vitals: /83  Pulse 73  GEN: This is a well developed, well-nourished female in no acute distress, in a pleasant mood.    SKIN: Focused examination of the head, scalp, and hands was performed.  -Prominent hinning of hair in the fronto  and temporal regions of the scalp. Decreased density throughout.   -Slightly increased density of terminal hairs over the central vertex scalp today compared to the previous vist.   -Mild perifollicular scale, with minimal erythema overall. Mild inflammation over the posterior vertex.   -Fine baby fibers throughout.   -Hair pull test negative  -LPPAI score 0.67  -Forehead with pink scaly, greasy papules coalescent into plaques.   -Palms of hands with scale, erythema, fissuring, and several pustules  -Decreased density of hair fibers over the bilateral forearms. Few isolated long terminal brown hairs.   -No other lesions of concern on areas examined.     Impression/Plan:  1. Androgenetic alopecia- She has stable disease activity as compared to last visit. She has not started the laser hair comb or minoxidil. No previous spironolactone or hormone based therapy. Reviewed in detail the etiology, pathophysiology, associated conditions and treatment options today.  Discussed her previous lab results (free T 0.48, DHEAS 31) and significance. Adherence to treatment recommendations have been limiting to date. After thorough discussion, the following plan was made today:     Start Rogaine Mens Strength Foam BID    Start spironolactone 25mg daily. Will increase to 50mg daily if tolerating well.     Again, recommend consideration of  laser light hair comb 3x/week    2. LPP- Relatively low disease activity today. Ongoing pruritus overlying the bilateral parietal scalp with mild stigmata of active disease overlying the vertex. Have proceeded to perform ILK. LPPAI score today is 0.67.    Kenalog intralesional injection procedure note: After verbal consent and discussion of risks including but not limited to atrophy, pain, and bruising, time out was performed, the patient underwent positioning and the area was prepped with isopropyl alcohol, 2 total cc of Kenalog 10 mg/cc was injected into ~20 sites on the frontal, temporal and  vertex scalp.  The patient tolerated the procedure well and left the Dermatology clinic in good condition.      3. Seborrheic dermatitis- Moderate-severe disease activity, involving her forehead and scalp. Some overlap with sebopsoriasis.     Start 2%  ketoconazole cream and hydrocortisone 2.5% cream BID to the affected area. Will use steroid cream <1/2 days of month.     4. Hand pustular psoriasis- Encouraged more frequent use of topical steroid and moisturizer. Reviewed the importance of avoiding the Octyl gallate. Plan:     Continue BID PRN Clobetasol 0.05% ointment     Continue calcipotriene ointment BID PRN on mild days and as maintenance.     CC Dr. Campbell on close of this encounter.  Follow-up in 3 months, earlier for new or changing lesions.       Dr. Campbell staffed the patient.    Staff Involved:  Resident(Salty Tomlinson )/Staff(as above)    Salty Tomlinson MD  PGY4 Dermatology  [phone]     Patient was seen and examined with the dermatology resident. I agree with the history, review of systems, physical examination, assessments and plan. ILK injections were completed together. Over 50% of this 20 minute visit was spent in consultation and discussion of treatment approaches.    Mary Campbell MD  Professor and  Chair  Department of Dermatology  [facility]

## 2018-07-16 NOTE — PATIENT INSTRUCTIONS
For the scaling on the face: Start ketoconazole and hydrocortisone creams twice daily as instructed. Limit hydrocortisone use to <1/2 the days of the month. Safe to use the keto cream daily long term.     For the hair-loss:   -Repeat injections today.   -Start spironolactone 25mg daily. If tolerating well, increase to 2 pills daily.   -Start minoxidil foam daily.   -    Avoid products with octyl gallate.

## 2018-07-16 NOTE — NURSING NOTE
Drug Administration Record    Drug Name: triamcinolone acetonide(kenalog)  Dose: 2mL of triamcinolone 10mg/mL, 20mg dose  Route administered: ID  NDC #: Kenalog-10 (8582-8774-17)  Amount of waste(mL):3  Reason for waste: Single use vial    LOT #: [id number]  SITE: [facility]  : [facility]  EXPIRATION DATE: Dec 2019

## 2018-08-13 ENCOUNTER — HOSPITAL ENCOUNTER (OUTPATIENT)
Dept: MAMMOGRAPHY | Facility: CLINIC | Age: 63
Discharge: HOME | End: 2018-08-13
Attending: OBSTETRICS & GYNECOLOGY | Admitting: OBSTETRICS & GYNECOLOGY
Payer: COMMERCIAL

## 2018-08-13 DIAGNOSIS — Z12.39 BREAST CANCER SCREENING: ICD-10-CM

## 2018-08-13 PROCEDURE — 77067 SCR MAMMO BI INCL CAD: CPT

## 2019-04-29 ENCOUNTER — OFFICE VISIT (OUTPATIENT)
Dept: DERMATOLOGY | Facility: CLINIC | Age: 64
End: 2019-04-29
Payer: COMMERCIAL

## 2019-04-29 VITALS — HEART RATE: 75 BPM | DIASTOLIC BLOOD PRESSURE: 78 MMHG | SYSTOLIC BLOOD PRESSURE: 120 MMHG

## 2019-04-29 DIAGNOSIS — L66.10 LICHEN PLANO-PILARIS: ICD-10-CM

## 2019-04-29 DIAGNOSIS — L64.9 ANDROGENETIC ALOPECIA: ICD-10-CM

## 2019-04-29 DIAGNOSIS — L40.3 PALMOPLANTAR PUSTULAR PSORIASIS: Primary | ICD-10-CM

## 2019-04-29 ASSESSMENT — PAIN SCALES - GENERAL: PAINLEVEL: NO PAIN (0)

## 2019-04-29 NOTE — PATIENT INSTRUCTIONS
When psoriasis is flaring on the palms, use the clobetasol ointment rather than the calcipotriene. Can cover with cotton gloves, do not use band aids as they can potentially injure the skin.    Use the clobetasol ointment on the eczema on the shins and cover with duoderm.    Massage safflower, olive or coconut oil into the hair in the evening.

## 2019-04-29 NOTE — NURSING NOTE
Dermatology Rooming Note    Maddie Sparks's goals for this visit include:   Chief Complaint   Patient presents with     Hair Loss     Maddie is here today for a hair loss follow up.      Derm Problem     Maddie is here today to have the palm of her hands checked.      RAHEEL Parrish

## 2019-04-29 NOTE — LETTER
"4/29/2019       RE: Maddie Sparks  [address] [facility] [id number]     Dear Colleague,    Thank you for referring your patient, Maddie Sparks, to the [facility] DERMATOLOGY at [facility]. Please see a copy of my visit note below.    [facility] Dermatology Note      Dermatology Problem List:  1. Lichen planopilaris.     2. Androgenetic alopecia.    3. Pustular psoriasis primarily of hands    4. Nummular dermatitis.     5. Nevi on the right parietal scalp    5. Retention hyperkeratosis on scalp    6. Actinic keratosis s/p cryotherapy    7. Occular rosacea, treated by optometry    8. \"Precancer\" on back, removed by outside dermatologist.      9. Patch testing at [facility], indicating allergy to oak hawkins and octyl gallate      Encounter Date: Apr 29, 2019    CC:  No chief complaint on file.        History of Present Illness:  Ms. Maddie Sparks is a 63 year old female who presents as a follow-up for hairloss. She has both androgenetic alopecia and LPP. She was last seen 7/16/18 when she had repeat ILK 2 total ml of Kenalog 10 mg/cc injected. The plan going forward at the conclusion of her last visit was to start Rogaine Mens Strength Foam BID, start spironolactone 25 mg daily and consider laser light hair comb 3x/week. She did not start the Rogaine as she had concerns after reading the adverse effects label and did not start the spironolactone as her endocrine provider had concern about negative effects due to her Hashimoto's. She has been using the free and clear shampoo twice per week.    She has new pustules on her hands today. She has been using calcipotriene on this without relief. She has also been using calcipotriene on an eczematous patch on her shin and covering it with a band aid and has not found this to be improving.      Past Medical History:   Patient Active Problem List   Diagnosis     Seborrheic dermatitis     Acne     Alopecia     " Chronic dermatitis of hands     Lichen plano-pilaris     Actinic keratosis     Inflamed seborrheic keratosis     Past Medical History:   Diagnosis Date     Hypothyroidism      Past Surgical History:   Procedure Laterality Date     NO HISTORY OF SURGERY  7/15/13    derm       Social History:  Patient reports that she has been smoking.  She has never used smokeless tobacco.    Family History:  Family History   Problem Relation Age of Onset     Cancer Father         BCC     Skin Cancer No family hx of      Melanoma No family hx of        Medications:  Current Outpatient Medications   Medication Sig Dispense Refill     acetaminophen-codeine (TYLENOL #3) 300-30 MG per tablet Take 1 tablet by mouth as needed.       atorvastatin (LIPITOR) 10 MG tablet Take 10 mg by mouth every other day       calcipotriene 0.005 % OINT Apply to hands twice a day every other day 90 g 1     calcium carbonate-vitamin D (CALCIUM 500 + D) 500-400 MG-UNIT TABS Take 1 tablet by mouth daily.       clobetasol (TEMOVATE) 0.05 % ointment Apply topically At Bedtime Apply to hands at twice a day every other day for two-three weeks, then taper to once a day every other day, then once a week as previously. 45 g 2     Coenzyme Q10 (COQ10) 100 MG CAPS Take 1 capsule by mouth daily       cyclobenzaprine (FLEXERIL) 10 MG tablet Take 10 mg by mouth daily.       hydrocortisone 2.5 % cream Apply twice daily to the affected face for 1-2 weeks at a time for redness/scaling. 30 g 3     ketoconazole (NIZORAL) 2 % cream Apply twice daily to scaly areas on the face. 30 g 11     ketoconazole (NIZORAL) 2 % shampoo Apply topically daily as needed for itching or irritation (Patient not taking: Reported on 7/16/2018) 120 mL 3     ketoconazole (NIZORAL) 2 % shampoo Apply topically daily as needed for itching or irritation (Patient not taking: Reported on 7/16/2018) 120 mL 3     levothyroxine (SYNTHROID) 100 MCG tablet Take  by mouth daily. Take Friday and Saturday          levothyroxine (SYNTHROID) 88 MCG tablet Take  by mouth daily. Take between sunday through thursday       Multiple Vitamins-Minerals (MULTIVITAMIN ADULT PO) Take 1 tablet by mouth daily       spironolactone (ALDACTONE) 25 MG tablet Take 1 tablet daily. If tolerating well, increase to 2 tablets daily. 60 tablet 3     UNKNOWN TO PATIENT Cream for eczema on legs       vitamin  B complex with vitamin C (STRESS TAB) TABS Take 1 tablet by mouth daily       Allergies   Allergen Reactions     Amoxicillin Difficulty breathing     Ampicillin Sodium Difficulty breathing         Review of Systems:  -As per HPI  -Constitutional: Otherwise feeling well today, in usual state of health.  -HEENT: Patient denies nonhealing oral sores.  -Skin: As above in HPI. No additional skin concerns.    Physical exam:  Vitals: /78, pulse 75  GEN: This is a well developed, well-nourished female in no acute distress, in a pleasant mood.    SKIN: Focused examination of the scalp, bilateral hands and right shin was performed.  -Prominent hinning of hair in the fronto and temporal regions of the scalp.  Slight increased density throughout when compared to photos  -Mild perifollicular scale, with minimal erythema overall. Mild erythema  over the posterior vertex scalp  -Palms of hands with scale, erythema, fissuring, and several pustules with some excoriation  -large patch on right anterior mid lower leg of erythema with overlying scale  -small, hyperpigmented irregular macule on right parietal scalp  -No other lesions of concern on areas examined.                         Impression/Plan:    1. Androgenetic alopecia- She has stable disease activity as compared to last visit. She has not started topical minoxidil or spironolactone. Reviewed in detail the etiology, pathophysiology, associated conditions and treatment options today. Adherence to treatment recommendations have been limiting to date. After thorough discussion, the following plan was  made today:     Start Rogaine Mens Strength Foam BID     2. LPP- Relatively low disease activity today. Ongoing  scale overlying the bilateral parietal scalp with mild stigmata of active disease overlying the vertex.    Recommended moisturizing with oil in the evening (safflower, olive, or coconut oil) on non Rogaine days    Continue Free and Clear shampoo     4. Hand pustular psoriasis- Encouraged more frequent use of topical steroid and moisturizer.     Continue BID PRN Clobetasol 0.05% ointment     Continue calcipotriene ointment BID PRN on mild days and as maintenance.       5.  Nevi on exam today on the left parietal scalp. Will plan to clinically monitor.      CC Referred Self, MD  No address on file on close of this encounter.  Follow-up in 3 months, earlier for new or changing lesions.     Staff Involved:  I,Cynthia Gallagher, MS4, saw and examined the patient in the presence of Dr. Campbell.    Provider Disclosure:   I agree with above History, Review of Systems, Physical exam and Plan. I have reviewed the content of the documentation and have edited it as needed. I have personally performed the services documented here and the documentation accurately represents those services and the decisions I have made.     Mary Campbell MD  Professor and Chair  Department of Dermatology  [facility]

## 2019-04-29 NOTE — PROGRESS NOTES
"[facility] Dermatology Note      Dermatology Problem List:  1. Lichen planopilaris.     2. Androgenetic alopecia.    3. Pustular psoriasis primarily of hands    4. Nummular dermatitis.     5. Nevi on the right parietal scalp    5. Retention hyperkeratosis on scalp    6. Actinic keratosis s/p cryotherapy    7. Occular rosacea, treated by optometry    8. \"Precancer\" on back, removed by outside dermatologist.      9. Patch testing at [facility], indicating allergy to oak hawkins and octyl gallate      Encounter Date: Apr 29, 2019    CC:  No chief complaint on file.        History of Present Illness:  Ms. Maddie Sparks is a 63 year old female who presents as a follow-up for hairloss. She has both androgenetic alopecia and LPP. She was last seen 7/16/18 when she had repeat ILK 2 total ml of Kenalog 10 mg/cc injected. The plan going forward at the conclusion of her last visit was to start Rogaine Mens Strength Foam BID, start spironolactone 25 mg daily and consider laser light hair comb 3x/week. She did not start the Rogaine as she had concerns after reading the adverse effects label and did not start the spironolactone as her endocrine provider had concern about negative effects due to her Hashimoto's. She has been using the free and clear shampoo twice per week.    She has new pustules on her hands today. She has been using calcipotriene on this without relief. She has also been using calcipotriene on an eczematous patch on her shin and covering it with a band aid and has not found this to be improving.      Past Medical History:   Patient Active Problem List   Diagnosis     Seborrheic dermatitis     Acne     Alopecia     Chronic dermatitis of hands     Lichen plano-pilaris     Actinic keratosis     Inflamed seborrheic keratosis     Past Medical History:   Diagnosis Date     Hypothyroidism      Past Surgical History:   Procedure Laterality Date     NO HISTORY OF SURGERY  7/15/13    derm       Social " History:  Patient reports that she has been smoking.  She has never used smokeless tobacco.    Family History:  Family History   Problem Relation Age of Onset     Cancer Father         BCC     Skin Cancer No family hx of      Melanoma No family hx of        Medications:  Current Outpatient Medications   Medication Sig Dispense Refill     acetaminophen-codeine (TYLENOL #3) 300-30 MG per tablet Take 1 tablet by mouth as needed.       atorvastatin (LIPITOR) 10 MG tablet Take 10 mg by mouth every other day       calcipotriene 0.005 % OINT Apply to hands twice a day every other day 90 g 1     calcium carbonate-vitamin D (CALCIUM 500 + D) 500-400 MG-UNIT TABS Take 1 tablet by mouth daily.       clobetasol (TEMOVATE) 0.05 % ointment Apply topically At Bedtime Apply to hands at twice a day every other day for two-three weeks, then taper to once a day every other day, then once a week as previously. 45 g 2     Coenzyme Q10 (COQ10) 100 MG CAPS Take 1 capsule by mouth daily       cyclobenzaprine (FLEXERIL) 10 MG tablet Take 10 mg by mouth daily.       hydrocortisone 2.5 % cream Apply twice daily to the affected face for 1-2 weeks at a time for redness/scaling. 30 g 3     ketoconazole (NIZORAL) 2 % cream Apply twice daily to scaly areas on the face. 30 g 11     ketoconazole (NIZORAL) 2 % shampoo Apply topically daily as needed for itching or irritation (Patient not taking: Reported on 7/16/2018) 120 mL 3     ketoconazole (NIZORAL) 2 % shampoo Apply topically daily as needed for itching or irritation (Patient not taking: Reported on 7/16/2018) 120 mL 3     levothyroxine (SYNTHROID) 100 MCG tablet Take  by mouth daily. Take Friday and Saturday         levothyroxine (SYNTHROID) 88 MCG tablet Take  by mouth daily. Take between sunday through thursday       Multiple Vitamins-Minerals (MULTIVITAMIN ADULT PO) Take 1 tablet by mouth daily       spironolactone (ALDACTONE) 25 MG tablet Take 1 tablet daily. If tolerating well, increase to  2 tablets daily. 60 tablet 3     UNKNOWN TO PATIENT Cream for eczema on legs       vitamin  B complex with vitamin C (STRESS TAB) TABS Take 1 tablet by mouth daily       Allergies   Allergen Reactions     Amoxicillin Difficulty breathing     Ampicillin Sodium Difficulty breathing         Review of Systems:  -As per HPI  -Constitutional: Otherwise feeling well today, in usual state of health.  -HEENT: Patient denies nonhealing oral sores.  -Skin: As above in HPI. No additional skin concerns.    Physical exam:  Vitals: /78, pulse 75  GEN: This is a well developed, well-nourished female in no acute distress, in a pleasant mood.    SKIN: Focused examination of the scalp, bilateral hands and right shin was performed.  -Prominent hinning of hair in the fronto and temporal regions of the scalp.  Slight increased density throughout when compared to photos  -Mild perifollicular scale, with minimal erythema overall. Mild erythema  over the posterior vertex scalp  -Palms of hands with scale, erythema, fissuring, and several pustules with some excoriation  -large patch on right anterior mid lower leg of erythema with overlying scale  -small, hyperpigmented irregular macule on right parietal scalp  -No other lesions of concern on areas examined.                         Impression/Plan:    1. Androgenetic alopecia- She has stable disease activity as compared to last visit. She has not started topical minoxidil or spironolactone. Reviewed in detail the etiology, pathophysiology, associated conditions and treatment options today. Adherence to treatment recommendations have been limiting to date. After thorough discussion, the following plan was made today:     Start Rogaine Mens Strength Foam BID     2. LPP- Relatively low disease activity today. Ongoing scale overlying the bilateral parietal scalp with mild stigmata of active disease overlying the vertex.    Recommended moisturizing with oil in the evening (safflower, olive,  or coconut oil) on non Rogaine days    Continue Free and Clear shampoo     4. Hand pustular psoriasis- Encouraged more frequent use of topical steroid and moisturizer.     Continue BID PRN Clobetasol 0.05% ointment     Continue calcipotriene ointment BID PRN on mild days and as maintenance.       5.  Nevi on exam today on the left parietal scalp. Will plan to clinically monitor.      CC Referred Self, MD  No address on file on close of this encounter.  Follow-up in 3 months, earlier for new or changing lesions.     Staff Involved:  I,Cynthia Gallagher, MS4, saw and examined the patient in the presence of Dr. Campbell.    Provider Disclosure:   I agree with above History, Review of Systems, Physical exam and Plan. I have reviewed the content of the documentation and have edited it as needed. I have personally performed the services documented here and the documentation accurately represents those services and the decisions I have made.     Mary Campbell MD  Professor and Chair  Department of Dermatology  [facility]

## 2019-09-30 ENCOUNTER — HOSPITAL ENCOUNTER (OUTPATIENT)
Dept: MAMMOGRAPHY | Facility: CLINIC | Age: 64
Discharge: HOME | End: 2019-09-30
Attending: OBSTETRICS & GYNECOLOGY | Admitting: OBSTETRICS & GYNECOLOGY
Payer: COMMERCIAL

## 2019-09-30 DIAGNOSIS — Z12.31 VISIT FOR SCREENING MAMMOGRAM: ICD-10-CM

## 2019-09-30 PROCEDURE — 77063 BREAST TOMOSYNTHESIS BI: CPT

## 2020-02-27 ENCOUNTER — TRANSFERRED RECORDS (OUTPATIENT)
Dept: HEALTH INFORMATION MANAGEMENT | Facility: CLINIC | Age: 65
End: 2020-02-27

## 2020-11-30 ENCOUNTER — HOSPITAL ENCOUNTER (OUTPATIENT)
Dept: MAMMOGRAPHY | Facility: CLINIC | Age: 65
Discharge: HOME | End: 2020-11-30
Attending: OBSTETRICS & GYNECOLOGY | Admitting: OBSTETRICS & GYNECOLOGY
Payer: COMMERCIAL

## 2020-11-30 DIAGNOSIS — Z12.31 VISIT FOR SCREENING MAMMOGRAM: ICD-10-CM

## 2020-11-30 PROCEDURE — 77063 BREAST TOMOSYNTHESIS BI: CPT

## 2022-01-10 ENCOUNTER — HOSPITAL ENCOUNTER (OUTPATIENT)
Dept: MAMMOGRAPHY | Facility: CLINIC | Age: 67
Discharge: HOME | End: 2022-01-10
Attending: OBSTETRICS & GYNECOLOGY | Admitting: OBSTETRICS & GYNECOLOGY
Payer: MEDICARE

## 2022-01-10 DIAGNOSIS — Z12.31 VISIT FOR SCREENING MAMMOGRAM: ICD-10-CM

## 2022-01-10 PROCEDURE — 77067 SCR MAMMO BI INCL CAD: CPT

## 2022-02-19 DIAGNOSIS — Z11.59 ENCOUNTER FOR SCREENING FOR OTHER VIRAL DISEASES: Primary | ICD-10-CM

## 2022-03-25 ENCOUNTER — TELEPHONE (OUTPATIENT)
Dept: FAMILY MEDICINE | Facility: CLINIC | Age: 67
End: 2022-03-25

## 2022-03-25 ENCOUNTER — LAB (OUTPATIENT)
Dept: LAB | Facility: CLINIC | Age: 67
End: 2022-03-25
Attending: COLON & RECTAL SURGERY
Payer: MEDICARE

## 2022-03-25 DIAGNOSIS — Z11.59 ENCOUNTER FOR SCREENING FOR OTHER VIRAL DISEASES: ICD-10-CM

## 2022-03-25 LAB — SARS-COV-2 RNA RESP QL NAA+PROBE: NEGATIVE

## 2022-03-25 PROCEDURE — U0003 INFECTIOUS AGENT DETECTION BY NUCLEIC ACID (DNA OR RNA); SEVERE ACUTE RESPIRATORY SYNDROME CORONAVIRUS 2 (SARS-COV-2) (CORONAVIRUS DISEASE [COVID-19]), AMPLIFIED PROBE TECHNIQUE, MAKING USE OF HIGH THROUGHPUT TECHNOLOGIES AS DESCRIBED BY CMS-2020-01-R: HCPCS

## 2022-03-25 PROCEDURE — U0005 INFEC AGEN DETEC AMPLI PROBE: HCPCS

## 2022-03-25 NOTE — TELEPHONE ENCOUNTER
Patient calls worried that her covid test won't be back before her procedure. Patient was here today at 1pm for covid test with the lab. Reassured her that the tests are run over the weekend. Sample was sent out with procedure sticker to be run STAT. Patient stated an understanding and agreed with plan.     Yasmeen Aranda RN  [facility]

## 2022-03-28 ENCOUNTER — HOSPITAL ENCOUNTER (OUTPATIENT)
Facility: CLINIC | Age: 67
Discharge: HOME | End: 2022-03-28
Attending: COLON & RECTAL SURGERY | Admitting: COLON & RECTAL SURGERY
Payer: MEDICARE

## 2022-03-28 VITALS
SYSTOLIC BLOOD PRESSURE: 131 MMHG | BODY MASS INDEX: 26.06 KG/M2 | RESPIRATION RATE: 16 BRPM | OXYGEN SATURATION: 96 % | TEMPERATURE: 97.3 F | DIASTOLIC BLOOD PRESSURE: 89 MMHG | WEIGHT: 138 LBS | HEART RATE: 82 BPM | HEIGHT: 61 IN

## 2022-03-28 LAB — COLONOSCOPY: NORMAL

## 2022-03-28 PROCEDURE — 99153 MOD SED SAME PHYS/QHP EA: CPT | Performed by: COLON & RECTAL SURGERY

## 2022-03-28 PROCEDURE — 250N000011 HC RX IP 250 OP 636: Performed by: COLON & RECTAL SURGERY

## 2022-03-28 PROCEDURE — 45378 DIAGNOSTIC COLONOSCOPY: CPT | Performed by: COLON & RECTAL SURGERY

## 2022-03-28 PROCEDURE — G0121 COLON CA SCRN NOT HI RSK IND: HCPCS | Performed by: COLON & RECTAL SURGERY

## 2022-03-28 PROCEDURE — G0500 MOD SEDAT ENDO SERVICE >5YRS: HCPCS | Performed by: COLON & RECTAL SURGERY

## 2022-03-28 RX ORDER — NALOXONE HYDROCHLORIDE 0.4 MG/ML
0.4 INJECTION, SOLUTION INTRAMUSCULAR; INTRAVENOUS; SUBCUTANEOUS
Status: DISCONTINUED | OUTPATIENT
Start: 2022-03-28 | End: 2022-03-28 | Stop reason: HOSPADM

## 2022-03-28 RX ORDER — ONDANSETRON 2 MG/ML
4 INJECTION INTRAMUSCULAR; INTRAVENOUS
Status: COMPLETED | OUTPATIENT
Start: 2022-03-28 | End: 2022-03-28

## 2022-03-28 RX ORDER — ONDANSETRON 2 MG/ML
4 INJECTION INTRAMUSCULAR; INTRAVENOUS EVERY 6 HOURS PRN
Status: DISCONTINUED | OUTPATIENT
Start: 2022-03-28 | End: 2022-03-28 | Stop reason: HOSPADM

## 2022-03-28 RX ORDER — EPINEPHRINE 1 MG/ML
0.1 INJECTION, SOLUTION INTRAMUSCULAR; SUBCUTANEOUS
Status: DISCONTINUED | OUTPATIENT
Start: 2022-03-28 | End: 2022-03-28 | Stop reason: HOSPADM

## 2022-03-28 RX ORDER — FLUMAZENIL 0.1 MG/ML
0.2 INJECTION, SOLUTION INTRAVENOUS
Status: DISCONTINUED | OUTPATIENT
Start: 2022-03-28 | End: 2022-03-28 | Stop reason: HOSPADM

## 2022-03-28 RX ORDER — FENTANYL CITRATE 0.05 MG/ML
50-100 INJECTION, SOLUTION INTRAMUSCULAR; INTRAVENOUS EVERY 5 MIN PRN
Status: DISCONTINUED | OUTPATIENT
Start: 2022-03-28 | End: 2022-03-28 | Stop reason: HOSPADM

## 2022-03-28 RX ORDER — LIDOCAINE 40 MG/G
CREAM TOPICAL
Status: DISCONTINUED | OUTPATIENT
Start: 2022-03-28 | End: 2022-03-28 | Stop reason: HOSPADM

## 2022-03-28 RX ORDER — NALOXONE HYDROCHLORIDE 0.4 MG/ML
0.2 INJECTION, SOLUTION INTRAMUSCULAR; INTRAVENOUS; SUBCUTANEOUS
Status: DISCONTINUED | OUTPATIENT
Start: 2022-03-28 | End: 2022-03-28 | Stop reason: HOSPADM

## 2022-03-28 RX ORDER — ONDANSETRON 4 MG/1
4 TABLET, ORALLY DISINTEGRATING ORAL EVERY 6 HOURS PRN
Status: DISCONTINUED | OUTPATIENT
Start: 2022-03-28 | End: 2022-03-28 | Stop reason: HOSPADM

## 2022-03-28 RX ORDER — DIPHENHYDRAMINE HYDROCHLORIDE 50 MG/ML
25-50 INJECTION INTRAMUSCULAR; INTRAVENOUS
Status: DISCONTINUED | OUTPATIENT
Start: 2022-03-28 | End: 2022-03-28 | Stop reason: HOSPADM

## 2022-03-28 RX ORDER — SIMETHICONE 40MG/0.6ML
133 SUSPENSION, DROPS(FINAL DOSAGE FORM)(ML) ORAL
Status: DISCONTINUED | OUTPATIENT
Start: 2022-03-28 | End: 2022-03-28 | Stop reason: HOSPADM

## 2022-03-28 RX ORDER — PROCHLORPERAZINE MALEATE 5 MG
5 TABLET ORAL EVERY 6 HOURS PRN
Status: DISCONTINUED | OUTPATIENT
Start: 2022-03-28 | End: 2022-03-28 | Stop reason: HOSPADM

## 2022-03-28 RX ORDER — ATROPINE SULFATE 0.4 MG/ML
1 AMPUL (ML) INJECTION
Status: DISCONTINUED | OUTPATIENT
Start: 2022-03-28 | End: 2022-03-28 | Stop reason: HOSPADM

## 2022-03-28 RX ADMIN — FENTANYL CITRATE 50 MCG: 0.05 INJECTION, SOLUTION INTRAMUSCULAR; INTRAVENOUS at 07:44

## 2022-03-28 RX ADMIN — ONDANSETRON 4 MG: 2 INJECTION INTRAMUSCULAR; INTRAVENOUS at 07:41

## 2022-03-28 RX ADMIN — MIDAZOLAM 1 MG: 1 INJECTION INTRAMUSCULAR; INTRAVENOUS at 07:45

## 2022-03-28 RX ADMIN — FENTANYL CITRATE 50 MCG: 0.05 INJECTION, SOLUTION INTRAMUSCULAR; INTRAVENOUS at 07:54

## 2022-03-28 RX ADMIN — MIDAZOLAM 1 MG: 1 INJECTION INTRAMUSCULAR; INTRAVENOUS at 07:54

## 2022-03-28 NOTE — H&P
Pre-Endoscopy History and Physical     Maddie Sparks MRN# [id number]   YOB: 1955 Age: 66 year old     Date of Procedure: 3/28/2022  Primary care provider: Maddie Wiley  Type of Endoscopy: colonoscopy  Reason for Procedure: screening  Type of Anesthesia Anticipated: Moderate Sedation    HPI:    Maddie is a 66 year old female who will be undergoing the above procedure.      A history and physical has been performed. The patient's medications and allergies have been reviewed. The risks and benefits of the procedure and the sedation options and risks were discussed with the patient.  All questions were answered and informed consent was obtained.      She denies a personal or family history of anesthesia complications or bleeding disorders.     Allergies   Allergen Reactions     Amoxicillin Difficulty breathing     Ampicillin Sodium Difficulty breathing        Prior to Admission Medications   Prescriptions Last Dose Informant Patient Reported? Taking?   Coenzyme Q10 (COQ10) 100 MG CAPS   Yes Yes   Sig: Take 1 capsule by mouth daily   Multiple Vitamins-Minerals (MULTIVITAMIN ADULT PO)   Yes Yes   Sig: Take 1 tablet by mouth daily   UNKNOWN TO PATIENT   Yes No   Sig: Cream for eczema on legs   acetaminophen-codeine (TYLENOL #3) 300-30 MG per tablet   Yes Yes   Sig: Take 1 tablet by mouth as needed.   atorvastatin (LIPITOR) 10 MG tablet   Yes Yes   Sig: Take 10 mg by mouth every other day   calcipotriene 0.005 % OINT   No Yes   Sig: Apply to hands twice a day every other day   calcium carbonate-vitamin D (CALCIUM 500 + D) 500-400 MG-UNIT TABS   Yes Yes   Sig: Take 1 tablet by mouth daily.   clobetasol (TEMOVATE) 0.05 % ointment   No Yes   Sig: Apply topically At Bedtime Apply to hands at twice a day every other day for two-three weeks, then taper to once a day every other day, then once a week as previously.   cyclobenzaprine (FLEXERIL) 10 MG tablet   Yes Yes   Sig: Take 10 mg by mouth daily.    hydrocortisone 2.5 % cream   No Yes   Sig: Apply twice daily to the affected face for 1-2 weeks at a time for redness/scaling.   ketoconazole (NIZORAL) 2 % cream   No Yes   Sig: Apply twice daily to scaly areas on the face.   ketoconazole (NIZORAL) 2 % shampoo   No Yes   Sig: Apply topically daily as needed for itching or irritation   ketoconazole (NIZORAL) 2 % shampoo   No Yes   Sig: Apply topically daily as needed for itching or irritation   levothyroxine (SYNTHROID) 100 MCG tablet   Yes Yes   Sig: Take  by mouth daily. Take Friday and Saturday     levothyroxine (SYNTHROID) 88 MCG tablet   Yes Yes   Sig: Take  by mouth daily. Take between sunday through thursday   spironolactone (ALDACTONE) 25 MG tablet   No Yes   Sig: Take 1 tablet daily. If tolerating well, increase to 2 tablets daily.   vitamin  B complex with vitamin C (STRESS TAB) TABS   Yes Yes   Sig: Take 1 tablet by mouth daily      Facility-Administered Medications: None       Patient Active Problem List   Diagnosis     Seborrheic dermatitis     Acne     Alopecia     Chronic dermatitis of hands     Lichen plano-pilaris     Actinic keratosis     Inflamed seborrheic keratosis        Past Medical History:   Diagnosis Date     Cancer (H)     multiple myloma     Hypothyroidism         Past Surgical History:   Procedure Laterality Date     COLONOSCOPY       NO HISTORY OF SURGERY  7/15/13    derm       Social History     Tobacco Use     Smoking status: Current Every Day Smoker     Smokeless tobacco: Never Used   Substance Use Topics     Alcohol use: Not on file       Family History   Problem Relation Age of Onset     Cancer Father         BCC     Skin Cancer No family hx of      Melanoma No family hx of        REVIEW OF SYSTEMS:     5 point ROS negative except as noted above in HPI, including Gen., Resp., CV, GI &  system review.      PHYSICAL EXAM:   There were no vitals taken for this visit. Estimated body mass index is 23.75 kg/m  as calculated from the  "following:    Height as of 8/29/16: 1.549 m (5' 1\").    Weight as of 8/29/16: 57 kg (125 lb 11.2 oz).   GENERAL APPEARANCE: healthy and alert  MENTAL STATUS: alert  AIRWAY EXAM: Mallampatti Class II (visualization of the soft palate, fauces, and uvula)  RESP: lungs clear to auscultation - no rales, rhonchi or wheezes  CV: regular rates and rhythm      DIAGNOSTICS:    Not indicated      IMPRESSION   ASA Class 2 - Mild systemic disease        PLAN:       Plan for colonoscopy. We discussed the risks, benefits and alternatives and the patient wished to proceed.    The above has been forwarded to the consulting provider.      Signed Electronically by: Aga Ornelas MD, MD  March 28, 2022    "

## 2022-03-28 NOTE — PROVIDER NOTIFICATION
Dr. Ornelas at pt bedside answered pt questions, spouse at bedside also.  Pt may resume all home medications including flexeril. Pt and spouse verbalize understanding.

## 2022-04-09 ENCOUNTER — HEALTH MAINTENANCE LETTER (OUTPATIENT)
Age: 67
End: 2022-04-09

## 2022-09-26 ENCOUNTER — LAB REQUISITION (OUTPATIENT)
Dept: LAB | Facility: CLINIC | Age: 67
End: 2022-09-26
Payer: MEDICARE

## 2022-09-26 DIAGNOSIS — R10.30 LOWER ABDOMINAL PAIN, UNSPECIFIED: ICD-10-CM

## 2022-09-26 PROCEDURE — 87086 URINE CULTURE/COLONY COUNT: CPT | Mod: ORL | Performed by: OBSTETRICS & GYNECOLOGY

## 2022-09-28 LAB — BACTERIA UR CULT: NO GROWTH

## 2022-10-03 ENCOUNTER — OFFICE VISIT (OUTPATIENT)
Dept: DERMATOLOGY | Facility: CLINIC | Age: 67
End: 2022-10-03
Payer: MEDICARE

## 2022-10-03 VITALS — DIASTOLIC BLOOD PRESSURE: 87 MMHG | SYSTOLIC BLOOD PRESSURE: 141 MMHG

## 2022-10-03 DIAGNOSIS — L30.9 DERMATITIS: Primary | ICD-10-CM

## 2022-10-03 DIAGNOSIS — L65.9 LOSS OF HAIR: ICD-10-CM

## 2022-10-03 PROCEDURE — 99204 OFFICE O/P NEW MOD 45 MIN: CPT | Performed by: DERMATOLOGY

## 2022-10-03 RX ORDER — GLUCOSAMINE HCL 500 MG
75 TABLET ORAL DAILY
COMMUNITY

## 2022-10-03 RX ORDER — PHENAZOPYRIDINE HYDROCHLORIDE 200 MG/1
200 TABLET, FILM COATED ORAL PRN
COMMUNITY
Start: 2022-03-16 | End: 2024-03-08

## 2022-10-03 RX ORDER — MAGNESIUM HYDROXIDE/ALUMINUM HYDROXICE/SIMETHICONE 120; 1200; 1200 MG/30ML; MG/30ML; MG/30ML
30 SUSPENSION ORAL EVERY 4 HOURS PRN
COMMUNITY

## 2022-10-03 RX ORDER — ASPIRIN 81 MG
100 TABLET, DELAYED RELEASE (ENTERIC COATED) ORAL DAILY
COMMUNITY

## 2022-10-03 RX ORDER — ACYCLOVIR 400 MG/1
400 TABLET ORAL EVERY 12 HOURS
COMMUNITY
Start: 2022-09-29

## 2022-10-03 RX ORDER — ACETAMINOPHEN 325 MG/1
1000 TABLET ORAL EVERY 6 HOURS PRN
COMMUNITY

## 2022-10-03 RX ORDER — LANSOPRAZOLE 30 MG/1
30 CAPSULE, DELAYED RELEASE ORAL
COMMUNITY
Start: 2022-08-31

## 2022-10-03 RX ORDER — ATORVASTATIN CALCIUM 20 MG/1
20 TABLET, FILM COATED ORAL DAILY
COMMUNITY
Start: 2022-09-27

## 2022-10-03 RX ORDER — TRIMETHOPRIM 100 MG/1
1 TABLET ORAL DAILY
COMMUNITY
Start: 2021-05-19

## 2022-10-03 RX ORDER — CARBOXYMETHYLCELLULOSE SODIUM 5 MG/ML
1 SOLUTION/ DROPS OPHTHALMIC 3 TIMES DAILY PRN
COMMUNITY
End: 2022-11-13

## 2022-10-03 ASSESSMENT — PAIN SCALES - GENERAL: PAINLEVEL: NO PAIN (0)

## 2022-10-03 NOTE — PROGRESS NOTES
"[facility] Dermatology Note  Encounter Date: Oct 3, 2022  Office Visit     Dermatology Problem List:  1. Lichen planopilaris.   2. Androgenetic alopecia.  3. Pustular psoriasis primarily of hands  4. Nummular dermatitis.   5. Nevi on the right parietal scalp  5. Retention hyperkeratosis on scalp  6. Actinic keratosis s/p cryotherapy  7. Occular rosacea, treated by optometry  8. \"Precancer\" on back, removed by outside dermatologist.    9. Patch testing at [facility] [place], indicating allergy to oak hawkins and octyl gallate  ____________________________________________    Assessment & Plan:    #Androgenetic alopecia- improved  #LPP  This appears improved from prior photos today.  She has not started topical minoxidil or Rogaine as prescribed previously.  We will have Maddie ask her oncology team if it would be okay to start platelet rich plasma or low oral dose minoxidil 1.25 mg to help with her hair loss.  -Apply moisturizing oils twice per week in the skin for 4 hours overnight then shampoo  -Platelet rich plasma or oral minoxidil 1.25 mg pending oncology approval  -Follow-up in 4 to 5 months with Dr. Campbell    #Hand pustular psoriasis, improved  Appears to be in remission.    #Hemangioma   There is a small hemangioma on her scalp that has no visible through the hair loss.  Reassured patient on this benign finding.    Procedures Performed:   Hair metrix and global photography    Follow-up: 5 month(s) in-person, or earlier for new or changing lesions    Staff and Medical Student:     Patient seen and staffed with Dr. Campbell.    Kevan Cole, MS4  [employer]     Staff Physician:  I was present with the medical student who participated in the service and in the documentation of the note. I have verified the history and personally performed the physical exam and medical decision making. I agree with the assessment and plan of care as documented in the note.     Mary DIANE" MD Shannan  Professor and Chair  Department of Dermatology  [facility]: Phone: [phone], Fax:[phone]  [facility]: Phone: [phone], Fax: [phone]          ____________________________________________    CC: Hair Loss    HPI:  Ms. Maddie Sparks is a(n) 66 year old female who presents today as a return patient for follow up for hairloss. She has both androgenetic alopecia and LPP. She was last seen on 4/29/2019 by Dr. Campbell for treatment of androgenetic alopecia using rogaine foam,  PRN clobetasol 0.05% and calcipotriene ointment. Since last visit she was diagnosed with multiple myeloma in 2020 and started on a chemotherapy regimen that was not supposed to cause hair loss.  This regimen includes Revlimid (CRL4 ubiquitin ligase modulator taken 3/20/20-11/24/20), Kyprolis (proteosome inhibitor taken 3/25/2020- 11/19/2020), zoledronic acid  (bisphosphonate taken 3/25/2020 - 11/4/2020), dexamethasone (3/25/2020 - 11/19/2020), acyclovir 400 mg started (viral DNA polymerase inhibitor 3/25/2020-present), and Velcade (proteosome mypeomear84/2/2020- present).  He is followed by [facility].    She says that her hair falls out but only 3 or so strands will follow up when she runs her hands through her hair.  She said her hair loss feels like it is gotten worse over the last year.  Does not use any creams or therapy for hair loss at this time. She washes her hair once per week with a stylist where they said it in rollers.  She did not use the Rogaine as previously prescribed because she did not want a wash her hair every day.  This was challenging for her.  She states she tried low level laser therapy in [place] a 2-5 years ago and thinks that this caused her to lose even more hair.  Eyebrows and eyelashes are ok.  She is concerned over spot on the left side of the scalp that she noticed  "\"a long time ago\" she says its been there for many years.  This spot  does not hurt, it has not bled, no drainage, or crusting.    Maddie notes her pustular psoriasis has resolved after chemotherapy.    Patient is otherwise feeling well, without additional skin concerns.    Labs:  External labs from [facility] from 9/21/2022 reviewed: CBC with differential and CMP without concerning findings.  Iron studies from 9/7/2022 reviewed: Total iron, Iron percent saturation, iron binding capacity unsaturated, iron binding capacity, reviewed without concerning findings. She did have an elevated ferritin of 144.1 ng/mL.    Physical Exam:  Vitals: There were no vitals taken for this visit.  SKIN: Focused examination of scalp and hands was performed.  -Prominent thinning of hair in the frontal and temporal regions of the scalp with severe hair loss across parietal and occipital scalp. Hair pull negative.  This is slightly improved from previous photos.  -There is also an approximately 4 mm red/violaceous macule on the left occipital region.  -Palms today are without scaling, erythema, fissuring or pustules  - No other lesions of concern on areas examined.     Hair matrix scores: Frontal anterior 73, mid scalp 17, vertex 57, occiput 70, right temple 94, left temple 92.  On imaging there is some perifollicular inflammation and dryness.    Medications:  Current Outpatient Medications   Medication     acetaminophen-codeine (TYLENOL #3) 300-30 MG per tablet     atorvastatin (LIPITOR) 10 MG tablet     calcipotriene 0.005 % OINT     calcium carbonate-vitamin D (CALCIUM 500 + D) 500-400 MG-UNIT TABS     clobetasol (TEMOVATE) 0.05 % ointment     Coenzyme Q10 (COQ10) 100 MG CAPS     cyclobenzaprine (FLEXERIL) 10 MG tablet     hydrocortisone 2.5 % cream     ketoconazole (NIZORAL) 2 % cream     ketoconazole (NIZORAL) 2 % shampoo     ketoconazole (NIZORAL) 2 % shampoo     levothyroxine (SYNTHROID) 100 MCG tablet     levothyroxine " (SYNTHROID) 88 MCG tablet     Multiple Vitamins-Minerals (MULTIVITAMIN ADULT PO)     spironolactone (ALDACTONE) 25 MG tablet     UNKNOWN TO PATIENT     vitamin  B complex with vitamin C (STRESS TAB) TABS     No current facility-administered medications for this visit.      Past Medical History:   Patient Active Problem List   Diagnosis     Seborrheic dermatitis     Acne     Alopecia     Chronic dermatitis of hands     Lichen plano-pilaris     Actinic keratosis     Inflamed seborrheic keratosis     Past Medical History:   Diagnosis Date     Cancer (H)     multiple myloma     Hypothyroidism         CC Referred Self, MD  No address on file on close of this encounter.

## 2022-10-03 NOTE — LETTER
"10/3/2022       RE: Maddie Sparks  [address] [address]     Dear Colleague,    Thank you for referring your patient, Maddie Sparks, to the [facility] [place] at [facility]. Please see a copy of my visit note below.    [facility] Dermatology Note  Encounter Date: Oct 3, 2022  Office Visit     Dermatology Problem List:  1. Lichen planopilaris.   2. Androgenetic alopecia.  3. Pustular psoriasis primarily of hands  4. Nummular dermatitis.   5. Nevi on the right parietal scalp  5. Retention hyperkeratosis on scalp  6. Actinic keratosis s/p cryotherapy  7. Occular rosacea, treated by optometry  8. \"Precancer\" on back, removed by outside dermatologist.    9. Patch testing [place] [facility] [place], indicating allergy to oak hawkins and octyl gallate  ____________________________________________    Assessment & Plan:    #Androgenetic alopecia- improved  #LPP  This appears improved from prior photos today.  She has not started topical minoxidil or Rogaine as prescribed previously.  We will have Maddie ask her oncology team if it would be okay to start platelet rich plasma or low oral dose minoxidil 1.25 mg to help with her hair loss.  -Apply moisturizing oils twice per week in the skin for 4 hours overnight then shampoo  -Platelet rich plasma or oral minoxidil 1.25 mg pending oncology approval  -Follow-up in 4 to 5 months with Dr. Campbell    #Hand pustular psoriasis, improved  Appears to be in remission.    #Hemangioma   There is a small hemangioma on her scalp that has no visible through the hair loss.  Reassured patient on this benign finding.    Procedures Performed:   None    Follow-up: 5 month(s) in-person, or earlier for new or changing lesions    Staff and Medical Student:     Patient seen and staffed with Dr. Campbell.    Kevan Cole, MS4  [facility] [employer] " "    ____________________________________________    CC: Hair Loss    HPI:  Ms. Maddie Sparks is a(n) 66 year old female who presents today as a return patient for follow up for hairloss. She has both androgenetic alopecia and LPP. She was last seen on 4/29/2019 by Dr. Campbell for treatment of androgenetic alopecia using rogaine foam, LPP, and pustular psoriasis using PRN clobetasol 0.05% and calcipotriene ointment. Since last visit she was diagnosed with multiple myeloma in 2020 and started on a chemotherapy regimen that was not supposed to cause hair loss.  This regimen includes Revlimid (CRL4 ubiquitin ligase modulator taken 3/20/20-11/24/20), Kyprolis (proteosome inhibitor taken 3/25/2020- 11/19/2020), zoledronic acid  (bisphosphonate taken 3/25/2020 - 11/4/2020), dexamethasone (3/25/2020 - 11/19/2020), acyclovir 400 mg started (viral DNA polymerase inhibitor 3/25/2020-present), and Velcade (proteosome bixpveiri06/2/2020- present).  He is followed by [facility].    She says that her hair falls out but only 3 or so strands will follow up when she runs her hands through her hair.  She said her hair loss feels like it is gotten worse over the last year.  Does not use any creams or therapy for hair loss at this time. She washes her hair once per week with a stylist where they said it in rollers.  She did not use the Rogaine as previously prescribed because she did not want a wash her hair every day.  This was challenging for her.  She states she tried low level laser therapy in [place] a 2-5 years ago and thinks that this caused her to lose even more hair.  Eyebrows and eyelashes are ok.  She is concerned over spot on the left side of the scalp that she noticed \"a long time ago\" she says its been there for many years.  This spot  does not hurt, it has not bled, no drainage, or crusting.    Maddie notes her pustular psoriasis has resolved after chemotherapy.    Patient is otherwise feeling well, " without additional skin concerns.    Labs:  External labs from [facility] from 9/21/2022 reviewed: CBC with differential and CMP without concerning findings.  Iron studies from 9/7/2022 reviewed: Total iron, Iron percent saturation, iron binding capacity unsaturated, iron binding capacity, reviewed without concerning findings. She did have an elevated ferritin of 144.1 ng/mL.    Physical Exam:  Vitals: There were no vitals taken for this visit.  SKIN: Focused examination of scalp and hands was performed.  -Prominent thinning of hair in the frontal and temporal regions of the scalp with severe hair loss across parietal and occipital scalp. Hair pull negative.  This is slightly improved from previous photos.  -There is also an approximately 4 mm red/violaceous macule on the left occipital region.  -Palms today are without scaling, erythema, fissuring or pustules  - No other lesions of concern on areas examined.     Hair matrix scores: Frontal anterior 73, mid scalp 17, vertex 57, occiput 70, right temple 94, left temple 92.  On imaging there is some perifollicular inflammation and dryness.    Medications:  Current Outpatient Medications   Medication     acetaminophen-codeine (TYLENOL #3) 300-30 MG per tablet     atorvastatin (LIPITOR) 10 MG tablet     calcipotriene 0.005 % OINT     calcium carbonate-vitamin D (CALCIUM 500 + D) 500-400 MG-UNIT TABS     clobetasol (TEMOVATE) 0.05 % ointment     Coenzyme Q10 (COQ10) 100 MG CAPS     cyclobenzaprine (FLEXERIL) 10 MG tablet     hydrocortisone 2.5 % cream     ketoconazole (NIZORAL) 2 % cream     ketoconazole (NIZORAL) 2 % shampoo     ketoconazole (NIZORAL) 2 % shampoo     levothyroxine (SYNTHROID) 100 MCG tablet     levothyroxine (SYNTHROID) 88 MCG tablet     Multiple Vitamins-Minerals (MULTIVITAMIN ADULT PO)     spironolactone (ALDACTONE) 25 MG tablet     UNKNOWN TO PATIENT     vitamin  B complex with vitamin C (STRESS TAB) TABS     No current facility-administered  medications for this visit.      Past Medical History:   Patient Active Problem List   Diagnosis     Seborrheic dermatitis     Acne     Alopecia     Chronic dermatitis of hands     Lichen plano-pilaris     Actinic keratosis     Inflamed seborrheic keratosis     Past Medical History:   Diagnosis Date     Cancer (H)     multiple myloma     Hypothyroidism         CC Referred Self, MD  No address on file on close of this encounter.      HairMetrix Summary (10/3/2022)    Frontal anterior    Mid scalp    Vertex    Occipital    Right temporal    Left temporal    Summary        Well circumscribed vascular lesion      Global Photography Summary (10/3/2022)    Frontal    Superior    Vertex        Again, thank you for allowing me to participate in the care of your patient.      Sincerely,    Mary Campbell MD

## 2022-10-03 NOTE — PATIENT INSTRUCTIONS
-Remember to ask your oncologist about if it is okay for you to start platelet rich plasma or low oral minoxidil 1.25mg for your hair loss.  - Please then message Dr. Campbell's team with his response and they will communicate and follow up with you   - If oncology is okay with the plan, Dr. Campbell will prescribe minoxidil. Please take this medication for 2-3 months prior to your next visit with Dr. Campbell.    -HairMatrix appointment on 12/5/22 7:50AM  -Follow up with Dr. Campbell in 4-5 months   
Adequate: hears normal conversation without difficulty

## 2022-10-03 NOTE — NURSING NOTE
Dermatology Rooming Note    Maddie Sparks's goals for this visit include:   Chief Complaint   Patient presents with     Hair Loss     Maddie is here for a HL follow-up. States condition has worsened since last seen.     Howard Brooke, EMT

## 2022-10-06 RX ORDER — FLUOCINOLONE ACETONIDE 0.11 MG/ML
OIL TOPICAL
Qty: 118 ML | Refills: 4 | Status: SHIPPED | OUTPATIENT
Start: 2022-10-06 | End: 2022-11-13

## 2022-10-10 ENCOUNTER — HEALTH MAINTENANCE LETTER (OUTPATIENT)
Age: 67
End: 2022-10-10

## 2022-10-10 NOTE — PROGRESS NOTES
HairMetrix Summary (10/3/2022)    Frontal anterior    Mid scalp    Vertex    Occipital    Right temporal    Left temporal    Summary        Well circumscribed vascular lesion

## 2022-12-01 ENCOUNTER — TELEPHONE (OUTPATIENT)
Dept: DERMATOLOGY | Facility: CLINIC | Age: 67
End: 2022-12-01

## 2023-04-04 ENCOUNTER — OFFICE VISIT (OUTPATIENT)
Dept: DERMATOLOGY | Facility: CLINIC | Age: 68
End: 2023-04-04
Payer: MEDICARE

## 2023-04-04 DIAGNOSIS — L65.9 LOSS OF HAIR: Primary | ICD-10-CM

## 2023-04-04 PROCEDURE — 99207 PR NO BILLABLE SERVICE THIS VISIT: CPT | Mod: 25

## 2023-04-05 NOTE — PROGRESS NOTES
HairMetrix Summary (04/04/23)    Frontal anterior (5.75 cm)        Mid scalp (12 cm)        Vertex (24 cm)        Occipital (30 cm)      Right temporal (8 cm, 7 cm)        Left temporal (6 cm, 9.5 cm)        Summary

## 2023-05-02 ENCOUNTER — HOSPITAL ENCOUNTER (OUTPATIENT)
Dept: MAMMOGRAPHY | Facility: CLINIC | Age: 68
Discharge: HOME | End: 2023-05-02
Attending: OBSTETRICS & GYNECOLOGY | Admitting: OBSTETRICS & GYNECOLOGY
Payer: MEDICARE

## 2023-05-02 DIAGNOSIS — Z12.31 VISIT FOR SCREENING MAMMOGRAM: ICD-10-CM

## 2023-05-02 PROCEDURE — 77067 SCR MAMMO BI INCL CAD: CPT

## 2023-05-16 ENCOUNTER — OFFICE VISIT (OUTPATIENT)
Dept: DERMATOLOGY | Facility: CLINIC | Age: 68
End: 2023-05-16
Payer: MEDICARE

## 2023-05-16 VITALS — HEART RATE: 96 BPM | SYSTOLIC BLOOD PRESSURE: 155 MMHG | OXYGEN SATURATION: 98 % | DIASTOLIC BLOOD PRESSURE: 101 MMHG

## 2023-05-16 DIAGNOSIS — L30.9 DERMATITIS: Primary | ICD-10-CM

## 2023-05-16 DIAGNOSIS — L65.9 LOSS OF HAIR: ICD-10-CM

## 2023-05-16 PROCEDURE — 99213 OFFICE O/P EST LOW 20 MIN: CPT | Performed by: DERMATOLOGY

## 2023-05-16 ASSESSMENT — PATIENT HEALTH QUESTIONNAIRE - PHQ9: SUM OF ALL RESPONSES TO PHQ QUESTIONS 1-9: 13

## 2023-05-16 NOTE — LETTER
"    5/16/2023         RE: Maddie Sparks  [address] [address]        Dear Colleague,    Thank you for referring your patient, Maddie Spraks, to the [facility]. Please see a copy of my visit note below.    [facility] Dermatology Note  Encounter Date: May 16, 2023  Office Visit     Dermatology Problem List:  1. Lichen planopilaris with concomitant androgenetic alopecia.  - Current tx: fluocinolone 0.01% oil  2. Pustular psoriasis primarily of hands  3. Nummular dermatitis.   4. Nevi on the right parietal scalp  5. Retention hyperkeratosis on scalp  6. Actinic keratosis s/p cryotherapy  7. Occular rosacea, treated by optometry  8. \"Precancer\" on back, removed by outside dermatologist.    9. Patch testing at [facility] [place], indicating allergy to oak hawkins and octyl gallate  10. History of multiple myeloma.  ____________________________________________     Assessment & Plan:     #Androgenetic alopecia- improved  #LPP  PRP not warranted per oncologist, pt does not feel she can handle side effects of oral minoxidil. Reviewed relative safety and minimal side effects of minor swelling and unwanted hair growth. Offered ILK injections today, pt is interested in trying. Will consider after consult with oncology. She reports some irritation on the scalp, unsure if this is related to her hair loss disease or irritation from her hair topper. She brought in samples of her hair that she has collected.   - LPPAI score: 0.67 today  - Continue fluocinolone 0.01% oil  - Consider ILK injections on follow up     # History of multiple myeloma, currently in remission.     Procedures Performed:   None.      Follow-up: 6/1/23 for ILK    Staff and Scribe:     Scribe Disclosure:   I, Shadi Crowley, am serving as a scribe to document services personally performed by this physician, Dr. Mary Campbell, based on data collection and the provider's statements to me.  "     Provider Disclosure:   The documentation recorded by the scribe accurately reflects the services I personally performed and the decisions made by me.    Mary Campbell MD  Professor and Chair  Department of Dermatology  [facility]: Phone: [phone], Fax:[phone]  [facility]: Phone: [phone], Fax: [phone]      ____________________________________________    CC: Hair Loss (Hair loss- not seeing improvement, noticing a lot more shedding in the nape of her neck and towards the back. Brought hair sample from her bangs last week. /Itchy scalp started this week- unsure if it is due to stress or hair piece)    HPI:  Ms. Maddie Sparks is a 67 year old female who presents as a return patient for follow-up of hair loss, diagnosed as AGA.   - Last seen on 10/3/22 in clinic  - Shedding or thinning, or both: stable  - Current tx: none  no Any new medications, supplements, or products? (please list below)     no Scalp pain   no Scalp burning   mild Scalp itching    no Eyebrow changes    no Eyelash changes   no Beard changes    no Other body hair changes    no Nail changes    no Additional symptoms? (please list below)       Patient is otherwise feeling well, in usual state of health, and has no additional skin concerns today.     Labs:  Iron studies  reviewed.    Physical Exam:  Vitals: BP (!) 155/101   Pulse 96   SpO2 98%   GEN: Well developed, well-nourished, in no acute distress, in a pleasant mood.    SKIN: Focused examination of scalp and face was performed.  SCALP -  - Fine fibers along the frontal hairline.  - no diffuse erythema   - mild perifollicular erythema  - mild perifollicular scale   - no scaling of the scalp   - negative hair pull test   - no scalp folliculitis/pustules   FACE -  - normal eyelash density  - normal eyebrow density  HANDS -  - no nail pitting or dystrophy   - No  other lesions of concern on areas examined.       Medications:  Current Outpatient Medications   Medication     acetaminophen (TYLENOL) 325 MG tablet     acyclovir (ZOVIRAX) 400 MG tablet     alum & mag hydroxide-simethicone (MAALOX) 200-200-20 MG/5ML SUSP suspension     aspirin (ASA) 81 MG EC tablet     atorvastatin (LIPITOR) 20 MG tablet     Bortezomib (VELCADE IJ)     Cholecalciferol (VITAMIN D3) 75 MCG (3000 UT) TABS     cyclobenzaprine (FLEXERIL) 10 MG tablet     docusate sodium (COLACE) 100 MG tablet     LANsoprazole (PREVACID) 30 MG DR capsule     levothyroxine (SYNTHROID/LEVOTHROID) 100 MCG tablet     levothyroxine (SYNTHROID/LEVOTHROID) 88 MCG tablet     phenazopyridine (PYRIDIUM) 200 MG tablet     trimethoprim (TRIMPEX) 100 MG tablet     acetaminophen-codeine (TYLENOL #3) 300-30 MG per tablet     atorvastatin (LIPITOR) 10 MG tablet     UNKNOWN TO PATIENT     vitamin  B complex with vitamin C (STRESS TAB) TABS     No current facility-administered medications for this visit.      Past Medical History:   Patient Active Problem List   Diagnosis     Seborrheic dermatitis     Acne     Alopecia     Chronic dermatitis of hands     Lichen plano-pilaris     Actinic keratosis     Inflamed seborrheic keratosis     Past Medical History:   Diagnosis Date     Cancer (H)     multiple myloma     Hypothyroidism        CC No referring provider defined for this encounter. on close of this encounter.      Again, thank you for allowing me to participate in the care of your patient.        Sincerely,        Mary Campbell MD

## 2023-05-16 NOTE — PROGRESS NOTES
"[facility] Dermatology Note  Encounter Date: May 16, 2023  Office Visit     Dermatology Problem List:  1. Lichen planopilaris with concomitant androgenetic alopecia.  - Current tx: fluocinolone 0.01% oil  2. Pustular psoriasis primarily of hands  3. Nummular dermatitis.   4. Nevi on the right parietal scalp  5. Retention hyperkeratosis on scalp  6. Actinic keratosis s/p cryotherapy  7. Occular rosacea, treated by optometry  8. \"Precancer\" on back, removed by outside dermatologist.    9. Patch testing at [facility], indicating allergy to oak hawkins and octyl gallate  10. History of multiple myeloma.  ____________________________________________     Assessment & Plan:     #Androgenetic alopecia- improved  #LPP  PRP not warranted per oncologist, pt does not feel she can handle side effects of oral minoxidil. Reviewed relative safety and minimal side effects of minor swelling and unwanted hair growth. Offered ILK injections today, pt is interested in trying. Will consider after consult with oncology. She reports some irritation on the scalp, unsure if this is related to her hair loss disease or irritation from her hair topper. She brought in samples of her hair that she has collected.   - LPPAI score: 0.67 today  - Continue fluocinolone 0.01% oil  - Consider ILK injections on follow up     # History of multiple myeloma, currently in remission.     Procedures Performed:   None.      Follow-up: 6/1/23 for ILK    Staff and Scribe:     Scribe Disclosure:   I, Shadi Crowley, am serving as a scribe to document services personally performed by this physician, Dr. Mary Campbell, based on data collection and the provider's statements to me.      Provider Disclosure:   The documentation recorded by the scribe accurately reflects the services I personally performed and the decisions made by me.    Mary Campbell MD  Professor and Chair  Department of Dermatology  [facility] " [facility]: Phone: [phone], Fax:[phone]  [facility]: Phone: [phone], Fax: [phone]      ____________________________________________    CC: Hair Loss (Hair loss- not seeing improvement, noticing a lot more shedding in the nape of her neck and towards the back. Brought hair sample from her bangs last week. /Itchy scalp started this week- unsure if it is due to stress or hair piece)    HPI:  Ms. Maddie Sparks is a 67 year old female who presents as a return patient for follow-up of hair loss, diagnosed as AGA.   - Last seen on 10/3/22 in clinic  - Shedding or thinning, or both: stable  - Current tx: none  no Any new medications, supplements, or products? (please list below)     no Scalp pain   no Scalp burning   mild Scalp itching    no Eyebrow changes    no Eyelash changes   no Beard changes    no Other body hair changes    no Nail changes    no Additional symptoms? (please list below)       Patient is otherwise feeling well, in usual state of health, and has no additional skin concerns today.     Labs:  Iron studies  reviewed.    Physical Exam:  Vitals: BP (!) 155/101   Pulse 96   SpO2 98%   GEN: Well developed, well-nourished, in no acute distress, in a pleasant mood.    SKIN: Focused examination of scalp and face was performed.  SCALP -  - Fine fibers along the frontal hairline.  - no diffuse erythema   - mild perifollicular erythema  - mild perifollicular scale   - no scaling of the scalp   - negative hair pull test   - no scalp folliculitis/pustules   FACE -  - normal eyelash density  - normal eyebrow density  HANDS -  - no nail pitting or dystrophy   - No other lesions of concern on areas examined.       Medications:  Current Outpatient Medications   Medication    acetaminophen (TYLENOL) 325 MG tablet    acyclovir (ZOVIRAX) 400 MG tablet    alum & mag hydroxide-simethicone (MAALOX) 200-200-20 MG/5ML SUSP suspension     aspirin (ASA) 81 MG EC tablet    atorvastatin (LIPITOR) 20 MG tablet    Bortezomib (VELCADE IJ)    Cholecalciferol (VITAMIN D3) 75 MCG (3000 UT) TABS    cyclobenzaprine (FLEXERIL) 10 MG tablet    docusate sodium (COLACE) 100 MG tablet    LANsoprazole (PREVACID) 30 MG DR capsule    levothyroxine (SYNTHROID/LEVOTHROID) 100 MCG tablet    levothyroxine (SYNTHROID/LEVOTHROID) 88 MCG tablet    phenazopyridine (PYRIDIUM) 200 MG tablet    trimethoprim (TRIMPEX) 100 MG tablet    acetaminophen-codeine (TYLENOL #3) 300-30 MG per tablet    atorvastatin (LIPITOR) 10 MG tablet    UNKNOWN TO PATIENT    vitamin  B complex with vitamin C (STRESS TAB) TABS     No current facility-administered medications for this visit.      Past Medical History:   Patient Active Problem List   Diagnosis    Seborrheic dermatitis    Acne    Alopecia    Chronic dermatitis of hands    Lichen plano-pilaris    Actinic keratosis    Inflamed seborrheic keratosis     Past Medical History:   Diagnosis Date    Cancer (H)     multiple myloma    Hypothyroidism        CC No referring provider defined for this encounter. on close of this encounter.

## 2023-05-16 NOTE — NURSING NOTE
Maddie Sparks's chief complaint for this visit includes:  Chief Complaint   Patient presents with     Hair Loss     Hair loss- not seeing improvement, noticing a lot more shedding in the nape of her neck and towards the back. Brought hair sample from her bangs last week.   Itchy scalp started this week- unsure if it is due to stress or hair piece     PCP: Maddie Wiley    Referring Provider:  No referring provider defined for this encounter.    BP (!) 155/101   Pulse 96   SpO2 98%   Data Unavailable        Allergies   Allergen Reactions     Sulfa Antibiotics Shortness Of Breath     Amoxicillin Difficulty breathing     Ampicillin Sodium Difficulty breathing     Cephalexin Unknown     Lorazepam Unknown     PN: Does not tolerate-unknown reaction  PN: Does not tolerate-unknown reaction       Tramadol Nausea     Latex Rash     Nitrofurantoin Other (See Comments) and Rash         Do you need any medication refills at today's visit?    No.       Zo Esparza LPN

## 2023-05-16 NOTE — PATIENT INSTRUCTIONS
I recommend consulting with your oncologist about the safety of low dose oral minoxidil. We would usually start with 1.25 mg daily and can increase to 2.5 mg or reduce 0.625 mg as needed to mitigate symptoms.       The use of minoxidil by  mouth to grow hair is not FDA approved.   Description and Brand Names ([facility])       Drug information provided by: [employer]    US Brand Name  Alma Rosa Holland    Minoxidil belongs to the general class of medicines called antihypertensives. It is used to treat high blood pressure (hypertension).    High blood pressure adds to the workload of the heart and arteries. If it continues for a long time, the heart and arteries may not function properly. This can damage the blood vessels of the brain, heart, and kidneys, resulting in a stroke, heart failure, or kidney failure. High blood pressure may also increase the risk of heart attacks. These problems may be less likely to occur if blood pressure is controlled.    Minoxidil works by relaxing blood vessels so that blood passes through them more easily. This helps to lower blood pressure.    Minoxidil has other effects that could be bothersome for some patients. These include increased hair growth, weight gain, fast heartbeat, and chest pain. Before you take this medicine, be sure that you have discussed the use of it with your doctor.    Minoxidil is being applied to the scalp in liquid form by some balding men to stimulate hair growth. However, improper use of liquids made from minoxidil tablets can result in minoxidil being absorbed into the body, where it may cause unwanted effects on the heart and blood vessels.    Minoxidil is available only with your doctor's prescription.    This product is available in the following dosage forms:    Tablet    Before Using  Drug information provided by: [employer]    In deciding to use a medicine, the risks of taking the medicine must be weighed against the good it will do.  This is a decision you and your doctor will make. For this medicine, the following should be considered:    Allergies  Tell your doctor if you have ever had any unusual or allergic reaction to this medicine or any other medicines. Also tell your health care professional if you have any other types of allergies, such as to foods, dyes, preservatives, or animals. For non-prescription products, read the label or package ingredients carefully.    Pediatric  Although there is no specific information comparing use of minoxidil in children with use in other age groups, this medicine is not expected to cause different side effects or problems in children than it does in adults.    Geriatric  Elderly patients may be more sensitive to the effects of minoxidil. In addition, minoxidil may reduce tolerance to cold temperatures in elderly patients.    Breastfeeding  Studies in women suggest that this medication poses minimal risk to the infant when used during breastfeeding.    Drug Interactions  Although certain medicines should not be used together at all, in other cases two different medicines may be used together even if an interaction might occur. In these cases, your doctor may want to change the dose, or other precautions may be necessary. Tell your healthcare professional if you are taking any other prescription or nonprescription (over-the-counter [OTC]) medicine.    Other Interactions  Certain medicines should not be used at or around the time of eating food or eating certain types of food since interactions may occur. Using alcohol or tobacco with certain medicines may also cause interactions to occur. Discuss with your healthcare professional the use of your medicine with food, alcohol, or tobacco.    Other Medical Problems  The presence of other medical problems may affect the use of this medicine. Make sure you tell your doctor if you have any other medical problems, especially:    Angina (chest pain)--Minoxidil may  make this condition worse  Heart attack or stroke (recent)--Lowering blood pressure may make problems resulting from heart attack or stroke worse  Heart or blood vessel disease--Minoxidil can cause fluid buildup, which can cause problems  Kidney disease--Effects may be increased because of slower removal of minoxidil from the body  Pheochromocytoma--Minoxidil may cause the tumor to be more active    Storage  Store the medicine in a closed container at room temperature, away from heat, moisture, and direct light. Keep from freezing.    Keep out of the reach of children.    Do not keep outdated medicine or medicine no longer needed.    Precautions  Drug information provided by: [employer]    It is important that your doctor check your progress at regular visits to make sure that this medicine is working properly.    Ask your doctor about checking your pulse rate before and after taking minoxidil. Then, while you are taking this medicine, check your pulse regularly while you are resting. If it increases by 20 beats or more a minute, check with your doctor right away.    While you are taking minoxidil, weigh yourself every day. A weight gain of 2 to 3 pounds (about 1 kg) in an adult is normal and should be lost with continued treatment. However, if you suddenly gain 5 pounds (2 kg) or more (for a child, 2 pounds [1 kg] or more) or if you notice swelling of your feet or lower legs, check with your doctor right away.    Do not take other medicines unless they have been discussed with your doctor. This especially includes over-the-counter (nonprescription) medicines for appetite control, asthma, colds, cough, hay fever, or sinus problems, since they may tend to increase your blood pressure.    Side Effects  Drug information provided by: [employer]    Along with its needed effects, a medicine may cause some unwanted effects. Although not all of these side effects may occur, if they do occur they may need medical  attention.    Check with your doctor immediately if any of the following side effects occur:    More common  Fast or irregular heartbeat  weight gain (rapid) of more than 5 pounds (2 pounds in children)  Less common  Chest pain  shortness of breath  Check with your doctor as soon as possible if any of the following side effects occur:    More common  Bloating  flushing or redness of skin  swelling of feet or lower legs  Less common  Numbness or tingling of hands, feet, or face  Rare  Skin rash and itching  Some side effects may occur that usually do not need medical attention. These side effects may go away during treatment as your body adjusts to the medicine. Also, your health care professional may be able to tell you about ways to prevent or reduce some of these side effects. Check with your health care professional if any of the following side effects continue or are bothersome or if you have any questions about them:    More common  Increase in hair growth, usually on face, arms, and back  Less common or rare  Breast tenderness in males and females  headache  This medicine causes a temporary increase in hair growth in most people. Hair may grow longer and darker in both men and women. This may first be noticed on the face several weeks after you start taking minoxidil. Later, new hair growth may be noticed on the back, arms, legs, and scalp. Talk to your doctor about shaving or using a hair remover during this time. After treatment with minoxidil has ended, the hair will stop growing, although it may take several months for the new hair growth to go away.    Other side effects not listed may also occur in some patients. If you notice any other effects, check with your healthcare professional.    Call your doctor for medical advice about side effects. You may report side effects to the FDA at 0-237-FDA-4268.

## 2023-06-01 ENCOUNTER — OFFICE VISIT (OUTPATIENT)
Dept: DERMATOLOGY | Facility: CLINIC | Age: 68
End: 2023-06-01
Payer: MEDICARE

## 2023-06-01 VITALS — OXYGEN SATURATION: 94 % | SYSTOLIC BLOOD PRESSURE: 126 MMHG | HEART RATE: 113 BPM | DIASTOLIC BLOOD PRESSURE: 81 MMHG

## 2023-06-01 NOTE — Clinical Note
6/1/2023         RE: Maddie Sparks  [id number] [address] [address]        Dear Colleague,    Thank you for referring your patient, Maddie Sparks, to the [facility]. Please see a copy of my visit note below.    No notes on file    Again, thank you for allowing me to participate in the care of your patient.        Sincerely,        Mary Campbell MD

## 2023-06-01 NOTE — PATIENT INSTRUCTIONS
For consideration   Low dose oral minoxidil 1.25 mg  OR  Spironolactone 200 mg daily  OR  Platelet rich plasma

## 2023-06-01 NOTE — NURSING NOTE
Maddie Sparks's goals for this visit include:   Chief Complaint   Patient presents with     Hair Loss     Patient here for hair loss and possible ILK injections,        She requests these members of her care team be copied on today's visit information:  PCP: Maddie Wiley    Referring Provider:  No referring provider defined for this encounter.    /81   Pulse 113   SpO2 94%     Do you need any medication refills at today's visit? No           June Vargas EMT

## 2023-06-05 ENCOUNTER — TELEPHONE (OUTPATIENT)
Dept: DERMATOLOGY | Facility: CLINIC | Age: 68
End: 2023-06-05
Payer: MEDICARE

## 2023-06-05 NOTE — TELEPHONE ENCOUNTER
Pt called wanting to reschedule her appointment with Dr. Campbell for ILK injections. Advised patient that her next soonest appointment is December 12th, 2023.     Pt said she will call us back tomorrow to see if she is able to move her other appointment or if we need to discuss other options.       Zo Esparza LPN

## 2023-06-12 NOTE — PROGRESS NOTES
"[facility] Dermatology Note  Encounter Date: Jun 13, 2023  Office Visit     Dermatology Problem List:  1. Lichen planopilaris with concomitant androgenetic alopecia.  - Current tx: fluocinolone 0.01% oil, ILK injections (6/13/23)  2. Pustular psoriasis primarily of hands  3. Nummular dermatitis.   4. Nevi on the right parietal scalp  5. Retention hyperkeratosis on scalp  6. Actinic keratosis s/p cryotherapy  7. Occular rosacea, treated by optometry  8. \"Precancer\" on back, removed by outside dermatologist.    9. Patch testing at [facility], indicating allergy to oak hawkins and octyl gallate  10. History of multiple myeloma.  ____________________________________________    Assessment & Plan:     #Androgenetic alopecia- stable. Pt reports shampooing once a week. Recommend to wash scalp more often throughout the week especially when sweating. Pt reports taking 3000 IU Vitamin D. Recommend washing scalp twice per week.     #LPP. Discussed future treatment considerations such as starting oral minoxidil. Discussed following up with oncologist to start oral minoxidil.   - LPPAI score: 1  - Continue fluocinolone 0.01% oil on the entire scalp once per week.  -Future consideration: oral minoxidil. Follow up with oncologist with starting oral minoxidil.      # History of multiple myeloma, currently in remission.     Procedures Performed:   - Intra-lesional triamcinolone procedure note. After verbal consent and review of risk of pain and skin thinning/atrophy, positioning and cleansing with isopropyl alcohol, 2 total mL of triamcinolone 10 mg/mL was injected into 20 site(s) in a grid-like pattern on the scalp. The patient tolerated the procedure well and left the dermatology clinic in good condition.      Follow-up: 3 month(s) in-person, or earlier for new or changing lesions    Staff and Scribe:     Scribe Disclosure:   Elaine RUEDA, am serving as a scribe to document services personally performed by this " physician, Dr. Mary Campbell, based on data collection and the provider's statements to me.      Provider Disclosure:   The documentation recorded by the scribe accurately reflects the services I personally performed and the decisions made by me. ILK injections were done by me.    Mary Campbell MD  Professor and Chair  Department of Dermatology  [facility]: Phone: [phone], Fax:[phone]  [facility]: Phone: [phone], Fax: [phone]      ____________________________________________    CC: Hair Loss (Patient here for ILK injections, hair loss is losing, areas of concern whole top)    HPI:  Ms. Maddie Sparks is a 67 year old female who presents as a return patient for follow-up of hair loss, diagnosed as androgenetic alopecia. Pt reports health has been stable since last visit.   - Last seen on 6/1/23.   - Current tx:  fluocinolone 0.01% oil, ILK injections   - Scalp or hair care habits/products: fluocinolone 0.01% oil, pt reports shampooing once a week  no Any new medications, supplements, or products? (please list below)     no Scalp pain   no Scalp burning   mild Scalp itching    no Eyebrow changes    no Eyelash changes   no Other body hair changes    no Nail changes    no Additional symptoms? (please list below)     - Overall course: stable    Patient is otherwise feeling well, in usual state of health, and has no additional skin concerns today.         Labs:   reviewed.  From 5/17/23  Iron saturation: 14%  Ferritin: 25ng/ml      Physical Exam:  Vitals: BP (!) 146/83   Pulse 76   SpO2 98%   GEN: Well developed, well-nourished, in no acute distress, in a pleasant mood.    SKIN: Focused examination of scalp, face and hands was performed.  SCALP -  - mild perifollicular erythema  - mild perifollicular scale   - mild scaling of the scalp   -  negative hair pull test   - no  scalp folliculitis/pustules     FACE -  - no normal eyelash density  - no normal eyebrow density  HANDS -  - no no nail pitting or dystrophy   - No other lesions of concern on areas examined.   Medications:  Current Outpatient Medications   Medication    acetaminophen (TYLENOL) 325 MG tablet    acetaminophen-codeine (TYLENOL #3) 300-30 MG per tablet    acyclovir (ZOVIRAX) 400 MG tablet    alum & mag hydroxide-simethicone (MAALOX) 200-200-20 MG/5ML SUSP suspension    aspirin (ASA) 81 MG EC tablet    atorvastatin (LIPITOR) 10 MG tablet    atorvastatin (LIPITOR) 20 MG tablet    Bortezomib (VELCADE IJ)    Cholecalciferol (VITAMIN D3) 75 MCG (3000 UT) TABS    cyclobenzaprine (FLEXERIL) 10 MG tablet    docusate sodium (COLACE) 100 MG tablet    LANsoprazole (PREVACID) 30 MG DR capsule    levothyroxine (SYNTHROID/LEVOTHROID) 100 MCG tablet    levothyroxine (SYNTHROID/LEVOTHROID) 88 MCG tablet    phenazopyridine (PYRIDIUM) 200 MG tablet    trimethoprim (TRIMPEX) 100 MG tablet    UNKNOWN TO PATIENT    vitamin  B complex with vitamin C (STRESS TAB) TABS     No current facility-administered medications for this visit.      Past Medical History:   Patient Active Problem List   Diagnosis    Seborrheic dermatitis    Acne    Alopecia    Chronic dermatitis of hands    Lichen plano-pilaris    Actinic keratosis    Inflamed seborrheic keratosis     Past Medical History:   Diagnosis Date    Cancer (H)     multiple myloma    Hypothyroidism        CC No referring provider defined for this encounter. on close of this encounter.

## 2023-06-13 ENCOUNTER — OFFICE VISIT (OUTPATIENT)
Dept: DERMATOLOGY | Facility: CLINIC | Age: 68
End: 2023-06-13
Payer: MEDICARE

## 2023-06-13 VITALS — DIASTOLIC BLOOD PRESSURE: 83 MMHG | OXYGEN SATURATION: 98 % | HEART RATE: 76 BPM | SYSTOLIC BLOOD PRESSURE: 146 MMHG

## 2023-06-13 DIAGNOSIS — L66.10 LICHEN PLANO-PILARIS: Primary | ICD-10-CM

## 2023-06-13 DIAGNOSIS — L30.9 DERMATITIS: ICD-10-CM

## 2023-06-13 PROCEDURE — 99213 OFFICE O/P EST LOW 20 MIN: CPT | Mod: 25 | Performed by: DERMATOLOGY

## 2023-06-13 PROCEDURE — 11901 INJECT SKIN LESIONS >7: CPT | Performed by: DERMATOLOGY

## 2023-06-13 PROCEDURE — 99207 PR NO BILLABLE SERVICE THIS VISIT: CPT | Performed by: DERMATOLOGY

## 2023-06-13 NOTE — NURSING NOTE
Maddie Sparks's goals for this visit include:   Chief Complaint   Patient presents with     Hair Loss     Patient here for ILK injections, hair loss is losing, areas of concern whole top       She requests these members of her care team be copied on today's visit information:     PCP: Maddie Wiley    Referring Provider:  No referring provider defined for this encounter.    BP (!) 146/83   Pulse 76   SpO2 98%     Do you need any medication refills at today's visit? No         June Vargas EMT        
ABDOMINAL PAIN/NAUSEA/constipation

## 2023-06-13 NOTE — LETTER
"    6/13/2023         RE: Maddie Sparks  [address] [facility] [address]        Dear Colleague,    Thank you for referring your patient, Maddie Sparks, to the [facility]. Please see a copy of my visit note below.    [facility] Dermatology Note  Encounter Date: Jun 13, 2023  Office Visit     Dermatology Problem List:  1. Lichen planopilaris with concomitant androgenetic alopecia.  - Current tx: fluocinolone 0.01% oil, ILK injections (6/13/23)  2. Pustular psoriasis primarily of hands  3. Nummular dermatitis.   4. Nevi on the right parietal scalp  5. Retention hyperkeratosis on scalp  6. Actinic keratosis s/p cryotherapy  7. Occular rosacea, treated by optometry  8. \"Precancer\" on back, removed by outside dermatologist.    9. Patch [place] at [facility] [place], indicating allergy to oak hawkins and octyl gallate  10. History of multiple myeloma.  ____________________________________________    Assessment & Plan:     #Androgenetic alopecia- stable. Pt reports shampooing once a week. Recommend to wash scalp more often throughout the week especially when sweating. Pt reports taking 3000 IU Vitamin D. Recommend washing scalp twice per week.     #LPP. Discussed future treatment considerations such as starting oral minoxidil. Discussed following up with oncologist to start oral minoxidil.   - LPPAI score: 1  - Continue fluocinolone 0.01% oil on the entire scalp once per week.  -Future consideration: oral minoxidil. Follow up with oncologist with starting oral minoxidil.      # History of multiple myeloma, currently in remission.     Procedures Performed:   - Intra-lesional triamcinolone procedure note. After verbal consent and review of risk of pain and skin thinning/atrophy, positioning and cleansing with isopropyl alcohol, 2 total mL of triamcinolone 10 mg/mL was injected into 20 site(s) in a grid-like pattern on the scalp. The patient tolerated the procedure well and left " the dermatology clinic in good condition.      Follow-up: 3 month(s) in-person, or earlier for new or changing lesions    Staff and Scribe:     Scribe Disclosure:   I, Elaine Duarte, am serving as a scribe to document services personally performed by this physician, Dr. Mary Campbell, based on data collection and the provider's statements to me.      Provider Disclosure:   The documentation recorded by the scribe accurately reflects the services I personally performed and the decisions made by me. ILK injections were done by me.    Mary Campbell MD  Professor and Chair  Department of Dermatology  [facility]: Phone: [phone], Fax:[phone]  [facility] [place]: Phone: [phone], Fax: [phone]      ____________________________________________    CC: Hair Loss (Patient here for ILK injections, hair loss is losing, areas of concern whole top)    HPI:  Ms. Maddie Sparks is a 67 year old female who presents as a return patient for follow-up of hair loss, diagnosed as androgenetic alopecia. Pt reports health has been stable since last visit.   - Last seen on 6/1/23.   - Current tx:  fluocinolone 0.01% oil, ILK injections   - Scalp or hair care habits/products: fluocinolone 0.01% oil, pt reports shampooing once a week  no Any new medications, supplements, or products? (please list below)     no Scalp pain   no Scalp burning   mild Scalp itching    no Eyebrow changes    no Eyelash changes   no Other body hair changes    no Nail changes    no Additional symptoms? (please list below)     - Overall course: stable    Patient is otherwise feeling well, in usual state of health, and has no additional skin concerns today.         Labs:   reviewed.  From 5/17/23  Iron saturation: 14%  Ferritin: 25ng/ml      Physical Exam:  Vitals: BP (!) 146/83   Pulse 76   SpO2 98%   GEN: Well developed,  well-nourished, in no acute distress, in a pleasant mood.    SKIN: Focused examination of scalp, face and hands was performed.  SCALP -  - mild perifollicular erythema  - mild perifollicular scale   - mild scaling of the scalp   -  negative hair pull test   - no scalp folliculitis/pustules     FACE -  - no normal eyelash density  - no normal eyebrow density  HANDS -  - no no nail pitting or dystrophy   - No other lesions of concern on areas examined.   Medications:  Current Outpatient Medications   Medication    acetaminophen (TYLENOL) 325 MG tablet    acetaminophen-codeine (TYLENOL #3) 300-30 MG per tablet    acyclovir (ZOVIRAX) 400 MG tablet    alum & mag hydroxide-simethicone (MAALOX) 200-200-20 MG/5ML SUSP suspension    aspirin (ASA) 81 MG EC tablet    atorvastatin (LIPITOR) 10 MG tablet    atorvastatin (LIPITOR) 20 MG tablet    Bortezomib (VELCADE IJ)    Cholecalciferol (VITAMIN D3) 75 MCG (3000 UT) TABS    cyclobenzaprine (FLEXERIL) 10 MG tablet    docusate sodium (COLACE) 100 MG tablet    LANsoprazole (PREVACID) 30 MG DR capsule    levothyroxine (SYNTHROID/LEVOTHROID) 100 MCG tablet    levothyroxine (SYNTHROID/LEVOTHROID) 88 MCG tablet    phenazopyridine (PYRIDIUM) 200 MG tablet    trimethoprim (TRIMPEX) 100 MG tablet    UNKNOWN TO PATIENT    vitamin  B complex with vitamin C (STRESS TAB) TABS     No current facility-administered medications for this visit.      Past Medical History:   Patient Active Problem List   Diagnosis    Seborrheic dermatitis    Acne    Alopecia    Chronic dermatitis of hands    Lichen plano-pilaris    Actinic keratosis    Inflamed seborrheic keratosis     Past Medical History:   Diagnosis Date    Cancer (H)     multiple myloma    Hypothyroidism        CC No referring provider defined for this encounter. on close of this encounter.      Again, thank you for allowing me to participate in the care of your patient.        Sincerely,        Mary Campbell MD

## 2023-06-29 ENCOUNTER — NURSE TRIAGE (OUTPATIENT)
Dept: FAMILY MEDICINE | Facility: CLINIC | Age: 68
End: 2023-06-29
Payer: MEDICARE

## 2023-06-29 NOTE — TELEPHONE ENCOUNTER
Situation     Patient calling about a potential medication reaction/ rash on face, neck and arm     Background   Cancer patient and I had treatment yesterday  She has been on the same medication for 2 years  Never had an allergic reaction before.     Assessment   I was in my garage and got really and looked at her self in the mirror and she said her cheeks,forehead,neck, eye lids and  part of her arm has a solid red rash- like a sunburn     Eyelids feel hot.     Rash has been present for the last 45 minutes    No swelling on face, lips, tongue, eyes or mouth   No vomiting    throat does not feel tight, voice is normal.   No itching  No hives or other type of rash anywhere else.     Has not taken benadryl-advised to take 50mg now, declined wants to check with oncology first due to all her medications.    Recommendations     Declined UC because she is immunocompromised-states she can wear a mask.     Advised OTC benadryl 50 mg - take as directed, start right now and continue as directed as long as rash persists.     advised to up date oncology on symptoms    Requested an appointment tomorrow multiple times, she states if symptoms worse she will go to UC     Scheduled 6/30, advised of location, arrival and appointment time.     Advised of reaction symptoms to monitor for.   Advised of red flag reaction symptoms -ER/911     Not routing no pcp   Has not been to [facility] primary care before.   Additional Information    Negative: Difficulty breathing or wheezing    Negative: Hoarseness or cough that started soon after 1st dose of drug    Negative: Swollen tongue that started soon after first dose of drug    Negative: Fever and purple or blood-colored spots or dots    Negative: Too weak or sick to stand    Negative: Sounds like a life-threatening emergency to the triager    Negative: Rash is only on 1 part of the body (localized)    Negative: Taking new non-prescription (OTC) antihistamine, decongestant, ear drops, eye  drops, or other OTC cough/cold medicine    Negative: Taking new prescription antihistamine, allergy medicine, asthma medicine, eye drops, ear drops or nose drops    Negative: Rash started more than 3 days after stopping new prescription medicine    Negative: Swollen tongue    Negative: Widespread hives and onset < 2 hours of exposure to 1st dose of drug    Negative: Fever    Negative: Face or lip swelling    Negative: Purple or blood-colored spots or dots (no fever and sounds well to triager)    Negative: Joint pain or swelling    Negative: Bloody crusts on lips or in mouth    Negative: Large or small blisters on skin (i.e., fluid filled bubbles or sacs)    Negative: Pregnant    Negative: Rash beginning within 4 hours of a new prescription medication    Negative: Hives or itching    Negative: Patient wants to be seen    Negative: Patient sounds very sick or weak to the triager    Negative: Taking new prescription antibiotic  (Exception: Finished taking new prescription antibiotic.)    Negative: Taking new prescription medicine  (Exceptions: Finished taking new prescription antibiotic OR questions about flushing from niacin.)    Negative: Rash started within 3 days after antibiotic stopped    Negative: Niacin flush suspected    Negative: Vancomycin flush, questions about    Negative: Sounds like a life-threatening emergency to the triager    Negative: Athlete's Foot suspected (i.e., itchy rash between the toes)    Negative: Insect bite(s) suspected    Negative: Jock Itch suspected (i.e., itchy rash on inner thighs near genital area)    Negative: Localized lump (or swelling) without redness or rash    Negative: Poison ivy, oak, or sumac contact suspected    Negative: Rash of female genital area (vulva)    Negative: Rash of male genital area (penis or scrotum)    Negative: Redness of immunization site    Negative: Shingles suspected (i.e., painful rash, multiple small blisters grouped together in one area of body;  dermatomal distribution)    Negative: Small spot, skin growth, or mole    Negative: Wound infection suspected (i.e., pain, spreading redness, or pus; in a cut, puncture, scrape or sutured wound)    Negative: Fever and localized purple or blood-colored spots or dots that are not from injury or friction    Negative: Fever and localized rash is very painful    Negative: Patient sounds very sick or weak to the triager    Negative: Looks like a boil, infected sore, deep ulcer, or other infected rash (spreading redness, pus)    Negative: Painful rash with multiple small blisters grouped together (i.e., dermatomal distribution or 'band' or 'stripe')    Negative: Localized rash is very painful (no fever)    Negative: Localized purple or blood-colored spots or dots that are not from injury or friction (no fever)    Negative: Lyme disease suspected (e.g., bull's-eye rash or tick bite / exposure)    Negative: Patient wants to be seen    Negative: Tender bumps in armpits    Negative: Pimples (localized) and no improvement after using CARE ADVICE    Negative: SEVERE local itching persists after 2 days of steroid cream    Negative: Applying cream or ointment and it causes severe itch, burning, or pain    Negative: Medication patch causing local rash or itching    Negative: Localized rash present > 7 days    Negative: Red, moist, irritated area between skin folds (or under larger breasts)    Negative: Localized area of skin darkening or thickening on lower legs or ankles and has NOT been evaluated by a doctor (or NP/PA)    Protocols used: RASH - WIDESPREAD ON DRUGS-A-OH, RASH OR REDNESS - [id number]  =

## 2023-08-28 NOTE — PROGRESS NOTES
"[facility] Dermatology Note  Encounter Date: Aug 29, 2023  Office Visit     Dermatology Problem List:  1. Lichen planopilaris with concomitant androgenetic alopecia.  - Current tx: fluocinolone 0.01% oil, ILK injections (6/13/23)  2. Pustular psoriasis primarily of hands  3. Nummular dermatitis.   4. Nevi on the right parietal scalp  5. Retention hyperkeratosis on scalp  6. Actinic keratosis s/p cryotherapy  7. Occular rosacea, treated by optometry  8. \"Precancer\" on back, removed by outside dermatologist.    9. Patch testing at [facility], indicating allergy to oak hawkins and octyl gallate  10. History of multiple myeloma.    ____________________________________________    Assessment & Plan:    # {Diagnosesderm:734706}.   {kkplans:962639}   - ***     # {Diagnosesderm:912198}.   {kkplans:439963}   - ***     Procedures Performed:   {kkprocedurenotes:328739}  {kkprocedurenotes:711166}    Follow-up: {kkfollowup:300120}    Staff and Scribe:     ***    ***  ____________________________________________    CC: No chief complaint on file.    HPI:  Ms. Maddie Sparks is a(n) 67 year old female who presents today as a return patient for Hair loss and ILK injections    Patient is otherwise feeling well, without additional skin concerns.    Labs Reviewed:  ***N/A    Physical Exam:  Vitals: There were no vitals taken for this visit.  SKIN: {kkSkinExam:085288}  - ***  - {Skin Exam Derm:190032}.   - {Skin Exam Derm:130049}.   - {Skin Exam Derm:254806}.   - No other lesions of concern on areas examined.     Medications:  Current Outpatient Medications   Medication    acetaminophen (TYLENOL) 325 MG tablet    acetaminophen-codeine (TYLENOL #3) 300-30 MG per tablet    acyclovir (ZOVIRAX) 400 MG tablet    alum & mag hydroxide-simethicone (MAALOX) 200-200-20 MG/5ML SUSP suspension    aspirin (ASA) 81 MG EC tablet    atorvastatin (LIPITOR) 10 MG tablet    atorvastatin (LIPITOR) 20 MG tablet    Bortezomib (VELCADE IJ)    " Cholecalciferol (VITAMIN D3) 75 MCG (3000 UT) TABS    cyclobenzaprine (FLEXERIL) 10 MG tablet    docusate sodium (COLACE) 100 MG tablet    LANsoprazole (PREVACID) 30 MG DR capsule    levothyroxine (SYNTHROID/LEVOTHROID) 100 MCG tablet    levothyroxine (SYNTHROID/LEVOTHROID) 88 MCG tablet    phenazopyridine (PYRIDIUM) 200 MG tablet    trimethoprim (TRIMPEX) 100 MG tablet    UNKNOWN TO PATIENT    vitamin  B complex with vitamin C (STRESS TAB) TABS     No current facility-administered medications for this visit.      Past Medical History:   Patient Active Problem List   Diagnosis    Seborrheic dermatitis    Acne    Alopecia    Chronic dermatitis of hands    Lichen plano-pilaris    Actinic keratosis    Inflamed seborrheic keratosis     Past Medical History:   Diagnosis Date    Cancer (H)     multiple myloma    Hypothyroidism         CC No referring provider defined for this encounter. on close of this encounter.

## 2023-08-29 ENCOUNTER — OFFICE VISIT (OUTPATIENT)
Dept: DERMATOLOGY | Facility: CLINIC | Age: 68
End: 2023-08-29
Payer: MEDICARE

## 2023-08-29 VITALS — DIASTOLIC BLOOD PRESSURE: 94 MMHG | SYSTOLIC BLOOD PRESSURE: 132 MMHG | OXYGEN SATURATION: 95 % | HEART RATE: 100 BPM

## 2023-08-29 DIAGNOSIS — L65.9 LOSS OF HAIR: Primary | ICD-10-CM

## 2023-08-29 PROCEDURE — 99207 PR NO BILLABLE SERVICE THIS VISIT: CPT | Performed by: DERMATOLOGY

## 2023-08-29 RX ORDER — MINOXIDIL 2.5 MG/1
TABLET ORAL
Qty: 90 TABLET | Refills: 1 | Status: SHIPPED | OUTPATIENT
Start: 2023-08-29 | End: 2024-03-08

## 2023-08-29 ASSESSMENT — PAIN SCALES - GENERAL: PAINLEVEL: NO PAIN (0)

## 2023-08-29 NOTE — NURSING NOTE
Maddie Sparks's chief complaint for this visit includes:  Chief Complaint   Patient presents with    Hair Loss     Noticed improvement after ILK - using fluocinolone 0.1% oil on scalp once weekly      PCP: Maddie Wiley    Referring Provider:  No referring provider defined for this encounter.    BP (!) 132/94   Pulse 100   SpO2 95%   No Pain (0)        Allergies   Allergen Reactions    Sulfa Antibiotics Shortness Of Breath    Amoxicillin Difficulty breathing    Ampicillin Sodium Difficulty breathing    Cephalexin Unknown    Lorazepam Unknown     PN: Does not tolerate-unknown reaction  PN: Does not tolerate-unknown reaction      Tramadol Nausea    Latex Rash    Nitrofurantoin Other (See Comments) and Rash         Do you need any medication refills at today's visit? No    Stephanie Maier CMA

## 2023-09-22 ENCOUNTER — LAB REQUISITION (OUTPATIENT)
Dept: LAB | Facility: CLINIC | Age: 68
End: 2023-09-22
Payer: MEDICARE

## 2023-09-22 DIAGNOSIS — N76.0 ACUTE VAGINITIS: ICD-10-CM

## 2023-09-22 PROCEDURE — 0352U MULTIPLEX VAGINAL PANEL BY PCR: CPT | Mod: ORL | Performed by: OBSTETRICS & GYNECOLOGY

## 2023-09-23 LAB
BACTERIAL VAGINOSIS VAG-IMP: NEGATIVE
CANDIDA DNA VAG QL NAA+PROBE: NOT DETECTED
CANDIDA GLABRATA / CANDIDA KRUSEI DNA: NOT DETECTED
T VAGINALIS DNA VAG QL NAA+PROBE: NOT DETECTED

## 2023-11-03 ENCOUNTER — LAB REQUISITION (OUTPATIENT)
Dept: LAB | Facility: CLINIC | Age: 68
End: 2023-11-03
Payer: MEDICARE

## 2023-11-03 DIAGNOSIS — R35.0 FREQUENCY OF MICTURITION: ICD-10-CM

## 2023-11-03 PROCEDURE — 87086 URINE CULTURE/COLONY COUNT: CPT | Mod: ORL | Performed by: OBSTETRICS & GYNECOLOGY

## 2023-11-05 LAB — BACTERIA UR CULT: NO GROWTH

## 2023-11-09 ENCOUNTER — TRANSFERRED RECORDS (OUTPATIENT)
Dept: HEALTH INFORMATION MANAGEMENT | Facility: CLINIC | Age: 68
End: 2023-11-09

## 2023-11-27 ENCOUNTER — LAB REQUISITION (OUTPATIENT)
Dept: LAB | Facility: CLINIC | Age: 68
End: 2023-11-27
Payer: MEDICARE

## 2023-11-27 DIAGNOSIS — R30.9 PAINFUL MICTURITION, UNSPECIFIED: ICD-10-CM

## 2023-11-27 PROCEDURE — 87086 URINE CULTURE/COLONY COUNT: CPT | Mod: ORL | Performed by: OBSTETRICS & GYNECOLOGY

## 2023-11-29 LAB — BACTERIA UR CULT: NORMAL

## 2024-01-04 ENCOUNTER — LAB REQUISITION (OUTPATIENT)
Dept: LAB | Facility: CLINIC | Age: 69
End: 2024-01-04
Payer: MEDICARE

## 2024-01-04 DIAGNOSIS — R35.0 FREQUENCY OF MICTURITION: ICD-10-CM

## 2024-01-04 PROCEDURE — 87086 URINE CULTURE/COLONY COUNT: CPT | Mod: ORL | Performed by: OBSTETRICS & GYNECOLOGY

## 2024-01-06 LAB — BACTERIA UR CULT: NO GROWTH

## 2024-01-12 ENCOUNTER — LAB REQUISITION (OUTPATIENT)
Dept: LAB | Facility: CLINIC | Age: 69
End: 2024-01-12
Payer: MEDICARE

## 2024-01-12 DIAGNOSIS — R35.0 FREQUENCY OF MICTURITION: ICD-10-CM

## 2024-01-12 DIAGNOSIS — N89.8 OTHER SPECIFIED NONINFLAMMATORY DISORDERS OF VAGINA: ICD-10-CM

## 2024-01-12 PROCEDURE — 87086 URINE CULTURE/COLONY COUNT: CPT | Mod: ORL | Performed by: OBSTETRICS & GYNECOLOGY

## 2024-01-13 LAB — BACTERIA UR CULT: NO GROWTH

## 2024-01-22 ENCOUNTER — MYC MEDICAL ADVICE (OUTPATIENT)
Dept: DERMATOLOGY | Facility: CLINIC | Age: 69
End: 2024-01-22
Payer: MEDICARE

## 2024-01-23 ENCOUNTER — OFFICE VISIT (OUTPATIENT)
Dept: DERMATOLOGY | Facility: CLINIC | Age: 69
End: 2024-01-23
Payer: MEDICARE

## 2024-01-23 DIAGNOSIS — L66.10 LICHEN PLANO-PILARIS: Primary | ICD-10-CM

## 2024-01-23 DIAGNOSIS — L30.9 DERMATITIS: ICD-10-CM

## 2024-01-23 DIAGNOSIS — Z85.79 HISTORY OF MULTIPLE MYELOMA: ICD-10-CM

## 2024-01-23 DIAGNOSIS — L64.9 ANDROGENETIC ALOPECIA: ICD-10-CM

## 2024-01-23 PROCEDURE — 99213 OFFICE O/P EST LOW 20 MIN: CPT | Performed by: DERMATOLOGY

## 2024-01-23 RX ORDER — FLUOCINOLONE ACETONIDE 0.11 MG/ML
OIL TOPICAL
Qty: 118.8 ML | Refills: 3 | Status: SHIPPED | OUTPATIENT
Start: 2024-01-23

## 2024-01-23 RX ORDER — HYDROXYZINE HYDROCHLORIDE 10 MG/1
10 TABLET, FILM COATED ORAL PRN
COMMUNITY
Start: 2024-01-13 | End: 2024-04-25 | Stop reason: ALTCHOICE

## 2024-01-23 RX ORDER — CLOBETASOL PROPIONATE 0.5 MG/G
OINTMENT TOPICAL 2 TIMES DAILY
Qty: 45 G | Refills: 3 | Status: SHIPPED | OUTPATIENT
Start: 2024-01-23

## 2024-01-23 RX ORDER — CLOBETASOL PROPIONATE 0.5 MG/G
OINTMENT TOPICAL DAILY
COMMUNITY
Start: 2023-03-06

## 2024-01-23 NOTE — LETTER
"    1/23/2024         RE: Maddie Sparks  [address] [address] [facility] [address]        Dear Colleague,    Thank you for referring your patient, Maddie Sparks, to the [facility]. Please see a copy of my visit note below.    [facility] Dermatology Note  Encounter Date: Jan 23, 2024  Office Visit     Dermatology Problem List:  1. Lichen planopilaris with concomitant androgenetic alopecia.  - Current tx: fluocinolone 0.01% oil, ILK injections (6/13/23)  - LPPAI: 0.67 (01/23/2024)  2. Pustular psoriasis primarily of hands  3. Nummular dermatitis.   4. Nevi on the right parietal scalp  5. Retention hyperkeratosis on scalp  6. Actinic keratosis s/p cryotherapy  7. Occular rosacea, treated by optometry  8. \"Precancer\" on back, removed by outside dermatologist.    9. Patch testing at [facility] [place], indicating allergy to oak hawkins and octyl gallate  10. History of multiple myeloma.    ____________________________________________    Assessment & Plan:    #Androgenetic alopecia- stable. Pt reports shampooing once a week. Recommend to wash scalp more often throughout the week especially when sweating. Pt reports taking 3000 IU Vitamin D. Recommend washing scalp twice per week.      #Lichen [place]-Pilaris  - LPPAI score: 0.67  - Continue fluocinolone 0.01% oil on the entire scalp once per week.     # History of multiple myeloma, currently receiving therapy     Procedures Performed:   None      Follow-up: 6 month(s) in-person, or earlier for new or changing lesions    Staff and Scribe:   Scribe Disclosure:   By signing my name below, I, Hannah Carter, attest that this documentation has been prepared under the direction and in the presence of Mary Campbell MD.  - Electronically Signed: Hannah Carter 01/23/24       Provider Disclosure:  I agree with above History, Review of Systems, Physical exam and Plan.  I have reviewed the content of the documentation and have edited it as " needed. I have personally performed the services documented here and the documentation accurately represents those services and the decisions I have made.      Electronically signed by:  Provider Disclosure:   The documentation recorded by the scribe accurately reflects the services I personally performed and the decisions made by me.    Mary Campbell MD  Professor and Chair  Department of Dermatology  [facility]: Phone: [phone], Fax:[phone]  [facility]: Phone: [phone], Fax: [phone]        ____________________________________________    CC: Hair Loss (Patient is here for a follow-up for hair loss.  Patient has not been doing any treatments due to cancer  treatments. )    HPI:  Ms. Maddie Sparks is a(n) 68 year old female who presents today as a return patient for lichen plano-pilaris.    Patient believes her chemotherapy is causing neuropathy and neuralgia giving her bladder and constipation problems. She has a lesion on her sacrum and it is giving her discomfort. She has been given treatments for the constipation but she has not seen much relief. She cannot take Minoxidil due to cardiac complications. She denies any scalp symptoms in clinic today. She has not been using her Fluocinolone oil, and has not had any psoriasis symptoms in three years. She uses Clean & Clear shampoo, and does her bangs on Tuesdays.    Patient reports she was started on Clobetasol for Lichen Sclerosis. She washes her hands a lot and does not have a moisturizer.    Patient is otherwise feeling well, without additional skin concerns.    Labs Reviewed:  N/A    Physical Exam:  Vitals: There were no vitals taken for this visit.   GEN: Well developed, well-nourished, in no acute distress, in a pleasant mood.    SKIN: Scalp, face and hand exam was performed.  - no diffuse erythema  - mild perifollicular  erythema  - mild perifollicular scale   - negative hair pull test   - L palmar hand is a vagus planus plaque with overlying scale  - Diffuse background of erythema with overlying scale on bilateral palmar hands  - In comparison to prior photographs, stable.    - No other lesions of concern on areas examined.           Medications:  Current Outpatient Medications   Medication     acetaminophen (TYLENOL) 325 MG tablet     acyclovir (ZOVIRAX) 400 MG tablet     aspirin (ASA) 81 MG EC tablet     atorvastatin (LIPITOR) 20 MG tablet     Bortezomib (VELCADE IJ)     Cholecalciferol (VITAMIN D3) 75 MCG (3000 UT) TABS     clobetasol (TEMOVATE) 0.05 % external ointment     cyclobenzaprine (FLEXERIL) 10 MG tablet     docusate sodium (COLACE) 100 MG tablet     hydrOXYzine HCl (ATARAX) 10 MG tablet     LANsoprazole (PREVACID) 30 MG DR capsule     levothyroxine (SYNTHROID/LEVOTHROID) 100 MCG tablet     levothyroxine (SYNTHROID/LEVOTHROID) 88 MCG tablet     phenazopyridine (PYRIDIUM) 200 MG tablet     trimethoprim (TRIMPEX) 100 MG tablet     acetaminophen-codeine (TYLENOL #3) 300-30 MG per tablet     alum & mag hydroxide-simethicone (MAALOX) 200-200-20 MG/5ML SUSP suspension     atorvastatin (LIPITOR) 10 MG tablet     minoxidil (LONITEN) 2.5 MG tablet     UNKNOWN TO PATIENT     vitamin  B complex with vitamin C (STRESS TAB) TABS     No current facility-administered medications for this visit.      Past Medical History:   Patient Active Problem List   Diagnosis     Seborrheic dermatitis     Acne     Alopecia     Chronic dermatitis of hands     Lichen plano-pilaris     Actinic keratosis     Inflamed seborrheic keratosis     Past Medical History:   Diagnosis Date     Cancer (H)     multiple myloma     Hypothyroidism         CC No referring provider defined for this encounter. on close of this encounter.       Again, thank you for allowing me to participate in the care of your patient.        Sincerely,        Mary Betancourt  MD Shann[place]

## 2024-01-23 NOTE — NURSING NOTE
Maddie Sparks's goals for this visit include:   Chief Complaint   Patient presents with    Hair Loss     Patient is here for a follow-up for hair loss.  Patient has not been doing any treatments due to cancer  treatments.       She requests these members of her care team be copied on today's visit information:     PCP: Maddie Wiley    Referring Provider:  No referring provider defined for this encounter.    There were no vitals taken for this visit.    Do you need any medication refills at today's visit?     Mary Krueger on 1/23/2024 at 9:48 AM

## 2024-01-23 NOTE — PROGRESS NOTES
"[facility] Dermatology Note  Encounter Date: Jan 23, 2024  Office Visit     Dermatology Problem List:  1. Lichen planopilaris with concomitant androgenetic alopecia.  - Current tx: fluocinolone 0.01% oil, ILK injections (6/13/23)  - LPPAI: 0.67 (01/23/2024)  2. Pustular psoriasis primarily of hands  3. Nummular dermatitis.   4. Nevi on the right parietal scalp  5. Retention hyperkeratosis on scalp  6. Actinic keratosis s/p cryotherapy  7. Occular rosacea, treated by optometry  8. \"Precancer\" on back, removed by outside dermatologist.    9. Patch testing at [facility], indicating allergy to oak hawkins and octyl gallate  10. History of multiple myeloma.    ____________________________________________    Assessment & Plan:    #Androgenetic alopecia- stable. Pt reports shampooing once a week. Recommend to wash scalp more often throughout the week especially when sweating. Pt reports taking 3000 IU Vitamin D. Recommend washing scalp twice per week.      #Lichen [place]-Pilaris  - LPPAI score: 0.67  - Continue fluocinolone 0.01% oil on the entire scalp once per week.     # History of multiple myeloma, currently receiving therapy     Procedures Performed:   None      Follow-up: 6 month(s) in-person, or earlier for new or changing lesions    Staff and Scribe:   Scribe Disclosure:   By signing my name below, I, Hannah Carter, attest that this documentation has been prepared under the direction and in the presence of Mary Campbell MD.  - Electronically Signed: Hannah Carter 01/23/24       Provider Disclosure:  I agree with above History, Review of Systems, Physical exam and Plan.  I have reviewed the content of the documentation and have edited it as needed. I have personally performed the services documented here and the documentation accurately represents those services and the decisions I have made.      Electronically signed by:  Provider Disclosure:   The documentation recorded by the scribe " accurately reflects the services I personally performed and the decisions made by me.    Mary Campbell MD  Professor and Chair  Department of Dermatology  Formerly named [facility]: Phone: [phone], Fax:[phone]  [facility]: Phone: [phone], Fax: [phone]        ____________________________________________    CC: Hair Loss (Patient is here for a follow-up for hair loss.  Patient has not been doing any treatments due to cancer  treatments. )    HPI:  Ms. Maddie Sparks is a(n) 68 year old female who presents today as a return patient for lichen plano-pilaris.    Patient believes her chemotherapy is causing neuropathy and neuralgia giving her bladder and constipation problems. She has a lesion on her sacrum and it is giving her discomfort. She has been given treatments for the constipation but she has not seen much relief. She cannot take Minoxidil due to cardiac complications. She denies any scalp symptoms in clinic today. She has not been using her Fluocinolone oil, and has not had any psoriasis symptoms in three years. She uses Clean & Clear shampoo, and does her bangs on Tuesdays.    Patient reports she was started on Clobetasol for Lichen Sclerosis. She washes her hands a lot and does not have a moisturizer.    Patient is otherwise feeling well, without additional skin concerns.    Labs Reviewed:  N/A    Physical Exam:  Vitals: There were no vitals taken for this visit.   GEN: Well developed, well-nourished, in no acute distress, in a pleasant mood.    SKIN: Scalp, face and hand exam was performed.  - no diffuse erythema  - mild perifollicular erythema  - mild perifollicular scale   - negative hair pull test   - L palmar hand is a vagus planus plaque with overlying scale  - Diffuse background of erythema with overlying scale on bilateral palmar hands  - In comparison to prior photographs,  stable.    - No other lesions of concern on areas examined.           Medications:  Current Outpatient Medications   Medication    acetaminophen (TYLENOL) 325 MG tablet    acyclovir (ZOVIRAX) 400 MG tablet    aspirin (ASA) 81 MG EC tablet    atorvastatin (LIPITOR) 20 MG tablet    Bortezomib (VELCADE IJ)    Cholecalciferol (VITAMIN D3) 75 MCG (3000 UT) TABS    clobetasol (TEMOVATE) 0.05 % external ointment    cyclobenzaprine (FLEXERIL) 10 MG tablet    docusate sodium (COLACE) 100 MG tablet    hydrOXYzine HCl (ATARAX) 10 MG tablet    LANsoprazole (PREVACID) 30 MG DR capsule    levothyroxine (SYNTHROID/LEVOTHROID) 100 MCG tablet    levothyroxine (SYNTHROID/LEVOTHROID) 88 MCG tablet    phenazopyridine (PYRIDIUM) 200 MG tablet    trimethoprim (TRIMPEX) 100 MG tablet    acetaminophen-codeine (TYLENOL #3) 300-30 MG per tablet    alum & mag hydroxide-simethicone (MAALOX) 200-200-20 MG/5ML SUSP suspension    atorvastatin (LIPITOR) 10 MG tablet    minoxidil (LONITEN) 2.5 MG tablet    UNKNOWN TO PATIENT    vitamin  B complex with vitamin C (STRESS TAB) TABS     No current facility-administered medications for this visit.      Past Medical History:   Patient Active Problem List   Diagnosis    Seborrheic dermatitis    Acne    Alopecia    Chronic dermatitis of hands    Lichen plano-pilaris    Actinic keratosis    Inflamed seborrheic keratosis     Past Medical History:   Diagnosis Date    Cancer (H)     multiple myloma    Hypothyroidism         CC No referring provider defined for this encounter. on close of this encounter.

## 2024-02-05 ENCOUNTER — LAB REQUISITION (OUTPATIENT)
Dept: LAB | Facility: CLINIC | Age: 69
End: 2024-02-05
Payer: MEDICARE

## 2024-02-05 DIAGNOSIS — R30.9 PAINFUL MICTURITION, UNSPECIFIED: ICD-10-CM

## 2024-02-05 DIAGNOSIS — L29.2 PRURITUS VULVAE: ICD-10-CM

## 2024-02-05 PROCEDURE — 87086 URINE CULTURE/COLONY COUNT: CPT | Mod: ORL | Performed by: OBSTETRICS & GYNECOLOGY

## 2024-02-05 PROCEDURE — 87070 CULTURE OTHR SPECIMN AEROBIC: CPT | Mod: ORL | Performed by: OBSTETRICS & GYNECOLOGY

## 2024-02-06 LAB — BACTERIA UR CULT: NO GROWTH

## 2024-02-07 LAB — BACTERIA WND CULT: NORMAL

## 2024-02-08 ENCOUNTER — TRANSFERRED RECORDS (OUTPATIENT)
Dept: HEALTH INFORMATION MANAGEMENT | Facility: CLINIC | Age: 69
End: 2024-02-08
Payer: MEDICARE

## 2024-03-04 ENCOUNTER — PATIENT OUTREACH (OUTPATIENT)
Dept: ONCOLOGY | Facility: CLINIC | Age: 69
End: 2024-03-04
Payer: MEDICARE

## 2024-03-04 DIAGNOSIS — C90.00 MULTIPLE MYELOMA NOT HAVING ACHIEVED REMISSION (H): Primary | ICD-10-CM

## 2024-03-04 NOTE — PROGRESS NOTES
New Patient Oncology Nurse Navigator Note     Referring provider: Self Referral for Second Opinion      Referring Clinic/Organization: [facility]      Referred to (specialty:) Hematology/Oncology     Requested provider (if applicable): NA     Date Referral Received: March 4, 2024     Evaluation for:  Multiple Myeloma      Clinical History (per Nurse review of records provided):      Patient was last seen at [facility] by dr. Malik on 2/21/24 for her Multiple Myeloma.   Per patient she was diagnosed in early 2020 and had a second opinion referral at [facility] in 9/2020. She was doing really well and was on maintenance Velcade injections every other week until January. She has history of bone lesion, constipation, right back and leg pain. She had a MRI of the sacrum in 7/2023 with ongoing or recent fracture of sacral all at S1 adjacent to the area of known and treated MM. At her appointment on 2/21 her oncologist discussed changing treatment to Revlimid. She just feels like she is following apart and really would like a second opinion about her care.      Records Location: Care Everywhere     Records Needed: NA     Additional testing needed prior to consult: NA    Payor: MEDICARE / Plan: MEDICARE / Product Type: Medicare /     March 4, 2024    Called patient to introduced myself and role as nurse navigator with [facility] Hematology/Oncology department and to inform them that we have received a self referral for a diagnosis of Multiple Myeloma for second opinion. Patient confirms they are aware of the referral and ready to schedule. Provided them with contact information for NPS and encourage them to call with any questions. Patient verbalized understanding of plan. Patient transferred to NPS to schedule.     Ilsa Sheth, RN, BSN  [facility] Hematology/Oncology Nurse Navigator  [phone]

## 2024-03-05 NOTE — TELEPHONE ENCOUNTER
RECORDS STATUS - ALL OTHER DIAGNOSIS      Action March 5, 2024 10:35 AM    Action Taken - Request is faxed to [facility] and [facility] for images to be push to  PACS.   - Slides Requested from Ally[facility] to be sent to [place] at [facility].     March 7, 2024 9:21 AM:  - Reached out to [facility] for Myeloma Labs from the past year on this patient (SPEP, FLC, immunofixation or immunoglobulins).    11:35 AM:  - Received labs, faxed to HIM and emailed to NN.     Imaging Received  March 6, 2024 8:59 AM    Action: Images from [facility] and [facility] received and resolved to PACS.     RECORDS RECEIVED FROM: [facility]   DATE RECEIVED:    NOTES STATUS DETAILS   OFFICE NOTE from referring provider External Self-Referral   OFFICE NOTE from medical oncologist External: [facility]/ [facility]- [place] 2/21/24: Dr. Carolyn Malik  9/21/20: Dr. Nyla Ruiz   OPERATIVE REPORT CE- [facility] 11/9/20: [facility]  2/27/20: [facility]   MEDICATION LIST CE- [facility]    LABS     PATHOLOGY REPORTS Report in - [facility]   (Slides Requested)   [facility] [facility] Tracking #: [id number] 11/9/20: [id number]   2/27/20: [id number]    ANYTHING RELATED TO DIAGNOSIS CE- [facility] Most recent 3/2/24   IMAGING (NEED IMAGES & REPORT)     CT SCANS (Img req from [facility]) 11/1/23: CT Lumbar Spine   MRI (Img req from [facility]) 7/25/23: MR Sacrum  3/8/21, 2/5/20: MR Lumbar Spine   XRAYS (Img req from [facility]) [facility]:  2/26/24: XR Sacrum   ULTRASOUND Img req from [facility]/ [facility]) 3/1/21: US Lower Extremity    [facility]:  7/29/20: US Lower Extremity   PET (Img req from [facility]) [facility]:  2/24/20: PET Skull to Mid Thigh

## 2024-03-07 ENCOUNTER — LAB (OUTPATIENT)
Dept: LAB | Facility: CLINIC | Age: 69
End: 2024-03-07
Payer: MEDICARE

## 2024-03-07 DIAGNOSIS — C90.00 PLASMA CELL MYELOMA (H): Primary | ICD-10-CM

## 2024-03-07 PROCEDURE — 88321 CONSLTJ&REPRT SLD PREP ELSWR: CPT | Performed by: STUDENT IN AN ORGANIZED HEALTH CARE EDUCATION/TRAINING PROGRAM

## 2024-03-08 ENCOUNTER — PRE VISIT (OUTPATIENT)
Dept: TRANSPLANT | Facility: CLINIC | Age: 69
End: 2024-03-08
Payer: MEDICARE

## 2024-03-08 ENCOUNTER — ONCOLOGY VISIT (OUTPATIENT)
Dept: TRANSPLANT | Facility: CLINIC | Age: 69
End: 2024-03-08
Attending: STUDENT IN AN ORGANIZED HEALTH CARE EDUCATION/TRAINING PROGRAM
Payer: MEDICARE

## 2024-03-08 VITALS
RESPIRATION RATE: 16 BRPM | WEIGHT: 131.9 LBS | HEART RATE: 69 BPM | BODY MASS INDEX: 24.9 KG/M2 | TEMPERATURE: 98.2 F | SYSTOLIC BLOOD PRESSURE: 149 MMHG | DIASTOLIC BLOOD PRESSURE: 93 MMHG | OXYGEN SATURATION: 99 % | HEIGHT: 61 IN

## 2024-03-08 DIAGNOSIS — F41.9 ANXIETY: ICD-10-CM

## 2024-03-08 DIAGNOSIS — K59.00 CONSTIPATION, UNSPECIFIED CONSTIPATION TYPE: ICD-10-CM

## 2024-03-08 DIAGNOSIS — G62.9 NEUROPATHY: ICD-10-CM

## 2024-03-08 DIAGNOSIS — C90.01 MULTIPLE MYELOMA IN REMISSION (H): Primary | ICD-10-CM

## 2024-03-08 PROCEDURE — G2211 COMPLEX E/M VISIT ADD ON: HCPCS | Performed by: STUDENT IN AN ORGANIZED HEALTH CARE EDUCATION/TRAINING PROGRAM

## 2024-03-08 PROCEDURE — G0463 HOSPITAL OUTPT CLINIC VISIT: HCPCS | Performed by: STUDENT IN AN ORGANIZED HEALTH CARE EDUCATION/TRAINING PROGRAM

## 2024-03-08 PROCEDURE — 99205 OFFICE O/P NEW HI 60 MIN: CPT | Performed by: STUDENT IN AN ORGANIZED HEALTH CARE EDUCATION/TRAINING PROGRAM

## 2024-03-08 RX ORDER — PHENAZOPYRIDINE HYDROCHLORIDE 200 MG/1
200 TABLET, FILM COATED ORAL 3 TIMES DAILY PRN
COMMUNITY

## 2024-03-08 RX ORDER — POLYETHYLENE GLYCOL 3350 17 G/17G
1 POWDER, FOR SOLUTION ORAL 2 TIMES DAILY
COMMUNITY

## 2024-03-08 RX ORDER — ESCITALOPRAM OXALATE 5 MG/1
5 TABLET ORAL DAILY
COMMUNITY
End: 2024-04-25

## 2024-03-08 RX ORDER — BISACODYL 5 MG/1
5 TABLET, DELAYED RELEASE ORAL DAILY PRN
COMMUNITY

## 2024-03-08 RX ORDER — SODIUM FLUORIDE 5 MG/G
GEL, DENTIFRICE DENTAL AT BEDTIME
COMMUNITY

## 2024-03-08 RX ORDER — LOSARTAN POTASSIUM 25 MG/1
25 TABLET ORAL DAILY
COMMUNITY

## 2024-03-08 ASSESSMENT — PAIN SCALES - GENERAL: PAINLEVEL: NO PAIN (0)

## 2024-03-08 NOTE — PROGRESS NOTES
Hematology/Oncology Consultation    Maddie Sparks is a 68 year old female self-referred for multiple myeloma.    Disease presentation and baseline characteristics:   2/27/2020: IgG kappa multiple myeloma    Date Treatment Name Response Side Effects / Toxicities   3/25/2020 KRd      Bortezomib maintenance            HPI: Please see above for hematologic history.  Mrs. Sparks is seen today accompanied by her  Jayesh for a second opinion regarding management of her multiple myeloma.  She notes she has significant anxiety in general and has been seeing multiple specialists for a variety of issues.  She was recently seen at an outside ED for jaw pain that was diagnosed as trigeminal neuralgia after imaging did not show any bone abnormalities.  She additionally has had issues with constipation and has been seeing outside GI for this.  She has chronic low back pain which has not changed recently, as well as gutierrez-rectal pain which she has discussed with her GI providers but has not found an underlying cause yet.  She sees outside neurology but is considering seeing a neurologist here and may want a referral later.  She is scheduled to see an outside pain specialist in two weeks, as was recommended by her other physicians to help with both her pain and anxiety.  She was recommended to start escitalopram but has not started this yet; she intermittently uses ativan for anxiety, and has an old gabapentin prescription (100 mg PO Qday) that she sometimes takes for pain.    Due to concern for neuropathy/trigeminal neuralgia Mrs. Sparks was recommended by her oncologist to switch maintenance from velcade to revlimid.  She is uncertain about doing this.      ASSESSMENT AND PLAN:  I reviewed Mrs. Sparks's available outside myeloma labs.  She fortunately appears to remain in remission based on available peripheral studies, with a slightly elevated K/L ratio but normal absolute kappa/lambda FLC.  Her outside SPEP shows no  evidence of monoclonal protein and her most recent CBC/CMP are unremarkable.  The optimal duration of maintenance for myeloma is still under investigation, but in general I would favor continuing as long as she tolerates it.  The transition from velcade to revlimid is reasonable given her concerns regarding neuropathy.  While on revlimid she should take a daily aspirin.    Mrs. Sparks clearly has significant anxiety, and I strongly encouraged her to follow up with her therapist and pain specialist.  Her  is supportive of this.  She was recommended to start escitalopram but has not done so; I noted this would not affect her myeloma or maintenance therapy and may help with her mood.  I encouraged her to limit the use of ativan/gabapentin until she can see her pain specialist, and to avoid stacking the medications as they are both sedating.    She wondered if velcade could cause her chronic constipation.  Although velcade can cause neuropathic issues that could theoretically slow gut transit, this would very much be a diagnosis of exclusion and I encouraged her to follow up with her GI physicians as her constipation may well be unrelated to her myeloma or therapy.  I did note that revlimid may cause loose stools.    She follows with an outside neurologist but is considering evaluation here.  I noted I would be willing to place a neurology referral if desired, but I am not certain they would have more to add, and I worry that she is following with a large number of overlapping providers now which may impair her care.  She will consider this and request a referral later if desired.    She will continue to follow with her primary oncologist.  No planned follow up with me but I remain available if new concerns arise regarding her myeloma.    I spent 60 minutes in the care of this patient today, which included time necessary for preparation for the visit, obtaining history, ordering medications/tests/procedures as  medically indicated, review of pertinent medical literature, counseling of the patient, communication of recommendations to the care team, and documentation time.      ROS:    10 point ROS neg other than the symptoms noted above in the HPI.        Past Medical History:   Diagnosis Date    Cancer (H)     multiple myloma    Hypothyroidism        Past Surgical History:   Procedure Laterality Date    COLONOSCOPY      COLONOSCOPY N/A 3/28/2022    Procedure: COLONOSCOPY;  Surgeon: Aga Ornelas MD;  Location: [facility]    NO HISTORY OF SURGERY  7/15/13    derm       Family History   Problem Relation Age of Onset    Cancer Father         BCC    Skin Cancer No family hx of     Melanoma No family hx of        Social History     Tobacco Use    Smoking status: Former     Packs/day: 1.00     Years: 40.00     Additional pack years: 0.00     Total pack years: 40.00     Types: Cigarettes     Quit date: 2020     Years since quittin.0    Smokeless tobacco: Never         Allergies   Allergen Reactions    Sulfa Antibiotics Shortness Of Breath    Amoxicillin Difficulty breathing    Ampicillin Sodium Difficulty breathing    Cephalexin Unknown    Lorazepam Unknown     PN: Does not tolerate-unknown reaction  PN: Does not tolerate-unknown reaction      Tramadol Nausea    Latex Rash    Nitrofurantoin Other (See Comments) and Rash        Current Outpatient Medications   Medication Sig Dispense Refill    acyclovir (ZOVIRAX) 400 MG tablet Take 400 mg by mouth every 12 hours      aspirin (ASA) 81 MG EC tablet Take 81 mg by mouth daily      atorvastatin (LIPITOR) 20 MG tablet Take 20 mg by mouth daily Alternating 20mg  then 40mg's every other day.      bisacodyl (DULCOLAX) 5 MG EC tablet Take 5 mg by mouth daily as needed for constipation      Bortezomib (VELCADE IJ) Inject as directed once a week      Cholecalciferol (VITAMIN D3) 75 MCG (3000 UT) TABS Take 75 mcg by mouth daily      clobetasol (TEMOVATE) 0.05 % external ointment Apply  "topically daily      clobetasol (TEMOVATE) 0.05 % external ointment Apply topically 2 times daily Apply to affected area on hands twice daily for no more than 14-21 days at a time. There after, use as needed for flares. 45 g 3    cyclobenzaprine (FLEXERIL) 10 MG tablet Take 10 mg by mouth daily.      docusate sodium (COLACE) 100 MG tablet Take 100 mg by mouth daily      escitalopram (LEXAPRO) 5 MG tablet Take 5 mg by mouth daily      LANsoprazole (PREVACID) 30 MG DR capsule Take 30 mg by mouth every morning (before breakfast)      levothyroxine (SYNTHROID/LEVOTHROID) 100 MCG tablet Take  by mouth daily. Take Friday and Saturday        levothyroxine (SYNTHROID/LEVOTHROID) 88 MCG tablet Take 88 mcg by mouth daily Take Sunday thru Thursday      losartan (COZAAR) 25 MG tablet Take 25 mg by mouth daily      phenazopyridine (PYRIDIUM) 200 MG tablet Take 200 mg by mouth 3 times daily as needed for irritation      polyethylene glycol (MIRALAX) 17 g packet Take 1 packet by mouth 2 times daily      sodium fluoride dental gel (PREVIDENT) 1.1 % GEL topical gel Apply to affected area at bedtime      trimethoprim (TRIMPEX) 100 MG tablet Take 1 tablet by mouth daily      acetaminophen (TYLENOL) 325 MG tablet Take 1,000 mg by mouth every 6 hours as needed for mild pain      alum & mag hydroxide-simethicone (MAALOX) 200-200-20 MG/5ML SUSP suspension Take 30 mLs by mouth every 4 hours as needed for indigestion      fluocinolone acetonide (DERMA SMOOTHE/FS BODY) 0.01 % external oil Use 2ml on scalp 2x weekly after showering. Let sit on scalp for 4 hours and max of overnight Using cap is optional 118.8 mL 3    hydrOXYzine HCl (ATARAX) 10 MG tablet Take 10 mg by mouth as needed for other (anxiety)           Physical Exam:     Vital Signs: BP (!) 149/93 (BP Location: Right arm, Patient Position: Sitting, Cuff Size: Adult Regular)   Pulse 69   Temp 98.2  F (36.8  C) (Oral)   Resp 16   Ht 1.549 m (5' 1\")   Wt 59.8 kg (131 lb 14.4 oz)  "  SpO2 99%   BMI 24.92 kg/m      ECO  General Appearance: alert, and no distress  Eyes: PERRL, conjunctiva and lids normal, sclera nonicteric  Ears/Nose/M/Throat: Oral mucosa and posterior oropharynx normal, moist mucous membranes  Neck supple, non-tender, free range of motion, no adenopathy  Cardio/Vascular:regular rate and rhythm, normal S1 and S2, no murmur  Resp Effort And Auscultation: Normal - Clear to auscultation without rales, rhonchi, or wheezing.  GI: soft, nontender, bowel sounds present in all four quadrants, no hepatosplenomegaly  Lymphatics:no significant enlargement of lymph nodes globally   Musculoskeletal: Musculoskeletal normal  Edema: none  Skin: Skin color, texture, turgor normal. No rashes or lesions.  Neurologic: Gait normal.  Sensation grossly WNL.  Psych/Affect: Mood and affect are appropriate.    Outside labs reviewed under Media tab.    The longitudinal plan of care for the diagnosis(es)/condition(s) as documented were addressed during this visit. Due to the added complexity in care, I will continue to support Maddie in the subsequent management and with ongoing continuity of care.    ------------------------------------------------------------------------------------------------------------------------------------------------    Patient Care Team         Relationship Specialty Notifications Start End    Maddie Wiley MD PCP - General OB/Gyn  13     Phone: [phone] Fax: [phone] [address] [facility] [address]    Mary Campbell MD MD Dermatology  10/7/15     Phone: [phone] Fax: [phone]         [address] [place] [address] [address] [facility] [address]    Mary Campbell MD Assigned Surgical Provider   22     Phone: [phone] Fax: [phone]         [address] [address]    Teodoro Velasquez MD MD Hematology & Oncology  3/4/24     Phone: [phone] Fax: [phone]         [address] [place] [address]  [address] [address]

## 2024-03-08 NOTE — NURSING NOTE
"Oncology Rooming Note    March 8, 2024 11:11 AM   Maddie Sparks is a 68 year old female who presents for:    Chief Complaint   Patient presents with    Oncology Clinic Visit     Multiple Myeloma     Initial Vitals: BP (!) 149/93 (BP Location: Right arm, Patient Position: Sitting, Cuff Size: Adult Regular)   Pulse 69   Temp 98.2  F (36.8  C) (Oral)   Resp 16   Ht 1.549 m (5' 1\")   Wt 59.8 kg (131 lb 14.4 oz)   SpO2 99%   BMI 24.92 kg/m   Estimated body mass index is 24.92 kg/m  as calculated from the following:    Height as of this encounter: 1.549 m (5' 1\").    Weight as of this encounter: 59.8 kg (131 lb 14.4 oz). Body surface area is 1.6 meters squared.  No Pain (0) Comment: Data Unavailable   No LMP recorded.  Allergies reviewed: Yes  Medications reviewed: Yes    Medications: Medication refills not needed today.  Pharm[facility] entered into [facility]: [id number] [place] [id number] [facility] [address] AT [place] [address] [facility]      Clinical concerns: Patient is new.       Adali Becerril LPN  3/8/2024              "

## 2024-03-08 NOTE — LETTER
3/8/2024         RE: Maddie Sparks  [id number] [facility] [address]  [facility] [id number]        Dear Colleague,    Thank you for referring your patient, Maddie Sparks, to the [facility] [place]. Please see a copy of my visit note below.         Hematology/Oncology Consultation    Maddie Sparks is a 68 year old female self-referred for multiple myeloma.    Disease presentation and baseline characteristics:   2/27/2020: IgG kappa multiple myeloma    Date Treatment Name Response Side Effects / Toxicities   3/25/2020 KRd      Bortezomib maintenance            HPI: Please see above for hematologic history.  Mrs. Sparks is seen today accompanied by her  Jayesh for a second opinion regarding management of her multiple myeloma.  She notes she has significant anxiety in general and has been seeing multiple specialists for a variety of issues.  She was recently seen at an outside ED for jaw pain that was diagnosed as trigeminal neuralgia after imaging did not show any bone abnormalities.  She additionally has had issues with constipation and has been seeing outside GI for this.  She has chronic low back pain which has not changed recently, as well as gutierrez-rectal pain which she has discussed with her GI providers but has not found an underlying cause yet.  She sees outside neurology but is considering seeing a neurologist here and may want a referral later.  She is scheduled to see an outside pain specialist in two weeks, as was recommended by her other physicians to help with both her pain and anxiety.  She was recommended to start escitalopram but has not started this yet; she intermittently uses ativan for anxiety, and has an old gabapentin prescription (100 mg PO Qday) that she sometimes takes for pain.    Due to concern for neuropathy/trigeminal neuralgia Mrs. Sparks was recommended by her oncologist to switch maintenance from velcade to revlimid.  She is uncertain about  doing this.      ASSESSMENT AND PLAN:  I reviewed Mrs. Sparks's available outside myeloma labs.  She fortunately appears to remain in remission based on available peripheral studies, with a slightly elevated K/L ratio but normal absolute kappa/lambda FLC.  Her outside SPEP shows no evidence of monoclonal protein and her most recent CBC/CMP are unremarkable.  The optimal duration of maintenance for myeloma is still under investigation, but in general I would favor continuing as long as she tolerates it.  The transition from velcade to revlimid is reasonable given her concerns regarding neuropathy.  While on revlimid she should take a daily aspirin.    Mrs. Sparks clearly has significant anxiety, and I strongly encouraged her to follow up with her therapist and pain specialist.  Her  is supportive of this.  She was recommended to start escitalopram but has not done so; I noted this would not affect her myeloma or maintenance therapy and may help with her mood.  I encouraged her to limit the use of ativan/gabapentin until she can see her pain specialist, and to avoid stacking the medications as they are both sedating.    She wondered if velcade could cause her chronic constipation.  Although velcade can cause neuropathic issues that could theoretically slow gut transit, this would very much be a diagnosis of exclusion and I encouraged her to follow up with her GI physicians as her constipation may well be unrelated to her myeloma or therapy.  I did note that revlimid may cause loose stools.    She follows with an outside neurologist but is considering evaluation here.  I noted I would be willing to place a neurology referral if desired, but I am not certain they would have more to add, and I worry that she is following with a large number of overlapping providers now which may impair her care.  She will consider this and request a referral later if desired.    She will continue to follow with her primary  oncologist.  No planned follow up with me but I remain available if new concerns arise regarding her myeloma.    I spent 60 minutes in the care of this patient today, which included time necessary for preparation for the visit, obtaining history, ordering medications/tests/procedures as medically indicated, review of pertinent medical literature, counseling of the patient, communication of recommendations to the care team, and documentation time.      ROS:    10 point ROS neg other than the symptoms noted above in the HPI.        Past Medical History:   Diagnosis Date    Cancer (H)     multiple myloma    Hypothyroidism        Past Surgical History:   Procedure Laterality Date    COLONOSCOPY      COLONOSCOPY N/A 3/28/2022    Procedure: COLONOSCOPY;  Surgeon: Aga Ornelas MD;  Location: [facility]    NO HISTORY OF SURGERY  7/15/13    derm       Family History   Problem Relation Age of Onset    Cancer Father         BCC    Skin Cancer No family hx of     Melanoma No family hx of        Social History     Tobacco Use    Smoking status: Former     Packs/day: 1.00     Years: 40.00     Additional pack years: 0.00     Total pack years: 40.00     Types: Cigarettes     Quit date: 2020     Years since quittin.0    Smokeless tobacco: Never         Allergies   Allergen Reactions    Sulfa Antibiotics Shortness Of Breath    Amoxicillin Difficulty breathing    Ampicillin Sodium Difficulty breathing    Cephalexin Unknown    Lorazepam Unknown     PN: Does not tolerate-unknown reaction  PN: Does not tolerate-unknown reaction      Tramadol Nausea    Latex Rash    Nitrofurantoin Other (See Comments) and Rash        Current Outpatient Medications   Medication Sig Dispense Refill    acyclovir (ZOVIRAX) 400 MG tablet Take 400 mg by mouth every 12 hours      aspirin (ASA) 81 MG EC tablet Take 81 mg by mouth daily      atorvastatin (LIPITOR) 20 MG tablet Take 20 mg by mouth daily Alternating 20mg  then 40mg's every other day.       bisacodyl (DULCOLAX) 5 MG EC tablet Take 5 mg by mouth daily as needed for constipation      Bortezomib (VELCADE IJ) Inject as directed once a week      Cholecalciferol (VITAMIN D3) 75 MCG (3000 UT) TABS Take 75 mcg by mouth daily      clobetasol (TEMOVATE) 0.05 % external ointment Apply topically daily      clobetasol (TEMOVATE) 0.05 % external ointment Apply topically 2 times daily Apply to affected area on hands twice daily for no more than 14-21 days at a time. There after, use as needed for flares. 45 g 3    cyclobenzaprine (FLEXERIL) 10 MG tablet Take 10 mg by mouth daily.      docusate sodium (COLACE) 100 MG tablet Take 100 mg by mouth daily      escitalopram (LEXAPRO) 5 MG tablet Take 5 mg by mouth daily      LANsoprazole (PREVACID) 30 MG DR capsule Take 30 mg by mouth every morning (before breakfast)      levothyroxine (SYNTHROID/LEVOTHROID) 100 MCG tablet Take  by mouth daily. Take Friday and Saturday        levothyroxine (SYNTHROID/LEVOTHROID) 88 MCG tablet Take 88 mcg by mouth daily Take Sunday thru Thursday      losartan (COZAAR) 25 MG tablet Take 25 mg by mouth daily      phenazopyridine (PYRIDIUM) 200 MG tablet Take 200 mg by mouth 3 times daily as needed for irritation      polyethylene glycol (MIRALAX) 17 g packet Take 1 packet by mouth 2 times daily      sodium fluoride dental gel (PREVIDENT) 1.1 % GEL topical gel Apply to affected area at bedtime      trimethoprim (TRIMPEX) 100 MG tablet Take 1 tablet by mouth daily      acetaminophen (TYLENOL) 325 MG tablet Take 1,000 mg by mouth every 6 hours as needed for mild pain      alum & mag hydroxide-simethicone (MAALOX) 200-200-20 MG/5ML SUSP suspension Take 30 mLs by mouth every 4 hours as needed for indigestion      fluocinolone acetonide (DERMA SMOOTHE/FS BODY) 0.01 % external oil Use 2ml on scalp 2x weekly after showering. Let sit on scalp for 4 hours and max of overnight Using cap is optional 118.8 mL 3    hydrOXYzine HCl (ATARAX) 10 MG tablet Take  "10 mg by mouth as needed for other (anxiety)           Physical Exam:     Vital Signs: BP (!) 149/93 (BP Location: Right arm, Patient Position: Sitting, Cuff Size: Adult Regular)   Pulse 69   Temp 98.2  F (36.8  C) (Oral)   Resp 16   Ht 1.549 m (5' 1\")   Wt 59.8 kg (131 lb 14.4 oz)   SpO2 99%   BMI 24.92 kg/m      ECO  General Appearance: alert, and no distress  Eyes: PERRL, conjunctiva and lids normal, sclera nonicteric  Ears/Nose/M/Throat: Oral mucosa and posterior oropharynx normal, moist mucous membranes  Neck supple, non-tender, free range of motion, no adenopathy  Cardio/Vascular:regular rate and rhythm, normal S1 and S2, no murmur  Resp Effort And Auscultation: Normal - Clear to auscultation without rales, rhonchi, or wheezing.  GI: soft, nontender, bowel sounds present in all four quadrants, no hepatosplenomegaly  Lymphatics:no significant enlargement of lymph nodes globally   Musculoskeletal: Musculoskeletal normal  Edema: none  Skin: Skin color, texture, turgor normal. No rashes or lesions.  Neurologic: Gait normal.  Sensation grossly WNL.  Psych/Affect: Mood and affect are appropriate.    Outside labs reviewed under Media tab.    The longitudinal plan of care for the diagnosis(es)/condition(s) as documented were addressed during this visit. Due to the added complexity in care, I will continue to support Maddie in the subsequent management and with ongoing continuity of care.    ------------------------------------------------------------------------------------------------------------------------------------------------    Patient Care Team         Relationship Specialty Notifications Start End    Maddie Wiley MD PCP - General OB/Gyn  13     Phone: [phone] Fax: [phone] [address] [address]    Mary Campbell MD MD Dermatology  10/7/15     Phone: [phone] Fax: [phone]         [address] [place] [address] [address] [facility]" [address]    Mary Campbell MD Assigned Surgical Provider   11/12/22     Phone: [phone] Fax: [phone]         [address] [facility] [address] [facility] [address]    Teodoro Velasquez MD MD Hematology & Oncology  3/4/24     Phone: [phone] Fax: [phone]         [address] [facility] [address] [facility] [address]            Teodoro Velasquez MD

## 2024-03-10 ENCOUNTER — OFFICE VISIT (OUTPATIENT)
Dept: URGENT CARE | Facility: URGENT CARE | Age: 69
End: 2024-03-10
Payer: MEDICARE

## 2024-03-10 VITALS
HEART RATE: 85 BPM | OXYGEN SATURATION: 99 % | DIASTOLIC BLOOD PRESSURE: 87 MMHG | TEMPERATURE: 97.8 F | SYSTOLIC BLOOD PRESSURE: 145 MMHG | RESPIRATION RATE: 15 BRPM

## 2024-03-10 DIAGNOSIS — R31.9 HEMATURIA, UNSPECIFIED TYPE: Primary | ICD-10-CM

## 2024-03-10 LAB
ALBUMIN UR-MCNC: NEGATIVE MG/DL
APPEARANCE UR: CLEAR
BILIRUB UR QL STRIP: NEGATIVE
COLOR UR AUTO: YELLOW
GLUCOSE UR STRIP-MCNC: NEGATIVE MG/DL
HGB UR QL STRIP: ABNORMAL
KETONES UR STRIP-MCNC: NEGATIVE MG/DL
LEUKOCYTE ESTERASE UR QL STRIP: NEGATIVE
NITRATE UR QL: NEGATIVE
PH UR STRIP: 6 [PH] (ref 5–7)
RBC #/AREA URNS AUTO: NORMAL /HPF
SP GR UR STRIP: >=1.03 (ref 1–1.03)
UROBILINOGEN UR STRIP-ACNC: 0.2 E.U./DL
WBC #/AREA URNS AUTO: NORMAL /HPF

## 2024-03-10 PROCEDURE — 99203 OFFICE O/P NEW LOW 30 MIN: CPT | Performed by: PHYSICIAN ASSISTANT

## 2024-03-10 PROCEDURE — 81001 URINALYSIS AUTO W/SCOPE: CPT | Performed by: PHYSICIAN ASSISTANT

## 2024-03-10 ASSESSMENT — ENCOUNTER SYMPTOMS: DYSURIA: 1

## 2024-03-10 NOTE — PROGRESS NOTES
SUBJECTIVE:   Maddie Sparks is a 68 year old female presenting with a chief complaint of   Chief Complaint   Patient presents with    Bladder Pain    Urgent Care    Medication Request     Med Side effects - wants to see lorazepam is okay has had previous reaction        She is a new patient of [facility]. (1)  Patient states dysuria since during the night.  No vaginal discharge.  Seeing urologist (2) anxiety and has lorazepam  Patient has taken escitalopram (for the first time and one tablet) and felt jittery and felt that her throat is closing.  Wants to know if she can take lorazepam.  Nausea and has zofran.          Review of Systems   Genitourinary:  Positive for dysuria. Negative for vaginal discharge.   All other systems reviewed and are negative.      Past Medical History:   Diagnosis Date    Cancer (H)     multiple myloma    Hypothyroidism      Family History   Problem Relation Age of Onset    Cancer Father         BCC    Skin Cancer No family hx of     Melanoma No family hx of      Current Outpatient Medications   Medication Sig Dispense Refill    acetaminophen (TYLENOL) 325 MG tablet Take 1,000 mg by mouth every 6 hours as needed for mild pain      acyclovir (ZOVIRAX) 400 MG tablet Take 400 mg by mouth every 12 hours      alum & mag hydroxide-simethicone (MAALOX) 200-200-20 MG/5ML SUSP suspension Take 30 mLs by mouth every 4 hours as needed for indigestion      aspirin (ASA) 81 MG EC tablet Take 81 mg by mouth daily      atorvastatin (LIPITOR) 20 MG tablet Take 20 mg by mouth daily Alternating 20mg  then 40mg's every other day.      bisacodyl (DULCOLAX) 5 MG EC tablet Take 5 mg by mouth daily as needed for constipation      Bortezomib (VELCADE IJ) Inject as directed once a week      Cholecalciferol (VITAMIN D3) 75 MCG (3000 UT) TABS Take 75 mcg by mouth daily      clobetasol (TEMOVATE) 0.05 % external ointment Apply topically daily      clobetasol (TEMOVATE) 0.05 % external ointment Apply topically 2 times  daily Apply to affected area on hands twice daily for no more than 14-21 days at a time. There after, use as needed for flares. 45 g 3    cyclobenzaprine (FLEXERIL) 10 MG tablet Take 10 mg by mouth daily.      docusate sodium (COLACE) 100 MG tablet Take 100 mg by mouth daily      escitalopram (LEXAPRO) 5 MG tablet Take 5 mg by mouth daily      fluocinolone acetonide (DERMA SMOOTHE/FS BODY) 0.01 % external oil Use 2ml on scalp 2x weekly after showering. Let sit on scalp for 4 hours and max of overnight Using cap is optional 118.8 mL 3    hydrOXYzine HCl (ATARAX) 10 MG tablet Take 10 mg by mouth as needed for other (anxiety)      LANsoprazole (PREVACID) 30 MG DR capsule Take 30 mg by mouth every morning (before breakfast)      levothyroxine (SYNTHROID/LEVOTHROID) 100 MCG tablet Take  by mouth daily. Take Friday and Saturday        levothyroxine (SYNTHROID/LEVOTHROID) 88 MCG tablet Take 88 mcg by mouth daily Take  thru Thursday      losartan (COZAAR) 25 MG tablet Take 25 mg by mouth daily      phenazopyridine (PYRIDIUM) 200 MG tablet Take 200 mg by mouth 3 times daily as needed for irritation      polyethylene glycol (MIRALAX) 17 g packet Take 1 packet by mouth 2 times daily      sodium fluoride dental gel (PREVIDENT) 1.1 % GEL topical gel Apply to affected area at bedtime      trimethoprim (TRIMPEX) 100 MG tablet Take 1 tablet by mouth daily       Social History     Tobacco Use    Smoking status: Former     Packs/day: 1.00     Years: 40.00     Additional pack years: 0.00     Total pack years: 40.00     Types: Cigarettes     Quit date: 2020     Years since quittin.0    Smokeless tobacco: Never   Substance Use Topics    Alcohol use: Not on file       OBJECTIVE  BP (!) 145/87 (BP Location: Left arm, Patient Position: Sitting, Cuff Size: Adult Regular)   Pulse 85   Temp 97.8  F (36.6  C) (Tympanic)   Resp 15   SpO2 99%     Physical Exam  Vitals and nursing note reviewed.   Constitutional:        Appearance: Normal appearance. She is normal weight.   HENT:      Head: Normocephalic and atraumatic.      Right Ear: Tympanic membrane, ear canal and external ear normal.      Left Ear: Tympanic membrane, ear canal and external ear normal.      Nose: Nose normal.      Mouth/Throat:      Mouth: Mucous membranes are moist.      Pharynx: Oropharynx is clear.   Eyes:      Extraocular Movements: Extraocular movements intact.      Conjunctiva/sclera: Conjunctivae normal.   Cardiovascular:      Rate and Rhythm: Normal rate and regular rhythm.      Pulses: Normal pulses.      Heart sounds: Normal heart sounds.   Pulmonary:      Effort: Pulmonary effort is normal.      Breath sounds: Normal breath sounds.   Musculoskeletal:      Cervical back: Normal range of motion.   Skin:     General: Skin is warm and dry.      Findings: No rash.   Neurological:      General: No focal deficit present.      Mental Status: She is alert.   Psychiatric:         Mood and Affect: Mood normal.         Behavior: Behavior normal.      Comments: Almost overwhelming anxiety.         Labs:  Results for orders placed or performed in visit on 03/10/24 (from the past 24 hour(s))   UA Macroscopic with reflex to Microscopic and Culture - Clinic Collect    Specimen: Urine, Clean Catch   Result Value Ref Range    Color Urine Yellow Colorless, Straw, Light Yellow, Yellow    Appearance Urine Clear Clear    Glucose Urine Negative Negative mg/dL    Bilirubin Urine Negative Negative    Ketones Urine Negative Negative mg/dL    Specific Gravity Urine >=1.030 1.003 - 1.035    Blood Urine Trace (A) Negative    pH Urine 6.0 5.0 - 7.0    Protein Albumin Urine Negative Negative mg/dL    Urobilinogen Urine 0.2 0.2, 1.0 E.U./dL    Nitrite Urine Negative Negative    Leukocyte Esterase Urine Negative Negative   UA Microscopic with Reflex to Culture   Result Value Ref Range    RBC Urine 0-2 0-2 /HPF /HPF    WBC Urine 0-5 0-5 /HPF /HPF    Narrative    Urine Culture not  indicated       ASSESSMENT:      ICD-10-CM    1. Hematuria, unspecified type  R31.9            Medical Decision Making:    Differential Diagnosis:  UTI: UTI, Dysuria, Pyelonephritis, Kidney Stone, and Urethritis    Serious Comorbid Conditions:  Adult:   reviewed    PLAN:    May use topical monistat.  Recommended not taking diflucan.  Can take lorazepam.  She will discuss with PCP tomorrow morning her SE with new selective serotonin reuptake inhibitor.  She may take her constipation medications - she has concerns about her magnesium and palpitations.        Followup:    If not improving or if condition worsens, follow up with your Primary Care Provider, If not improving or if conditions worsens over the next 12-24 hours, go to the Emergency Department    There are no Patient Instructions on file for this visit.

## 2024-03-12 LAB
PATH REPORT.COMMENTS IMP SPEC: ABNORMAL
PATH REPORT.COMMENTS IMP SPEC: YES
PATH REPORT.FINAL DX SPEC: ABNORMAL
PATH REPORT.GROSS SPEC: ABNORMAL
PATH REPORT.MICROSCOPIC SPEC OTHER STN: ABNORMAL
PATH REPORT.RELEVANT HX SPEC: ABNORMAL
PATH REPORT.RELEVANT HX SPEC: ABNORMAL
PATH REPORT.SITE OF ORIGIN SPEC: ABNORMAL

## 2024-04-03 ENCOUNTER — PRE VISIT (OUTPATIENT)
Dept: OTHER | Age: 69
End: 2024-04-03

## 2024-04-03 ENCOUNTER — TRANSCRIBE ORDERS (OUTPATIENT)
Dept: OTHER | Age: 69
End: 2024-04-03

## 2024-04-03 ENCOUNTER — PATIENT OUTREACH (OUTPATIENT)
Dept: ONCOLOGY | Facility: CLINIC | Age: 69
End: 2024-04-03
Payer: MEDICARE

## 2024-04-03 DIAGNOSIS — L29.2 PRURITUS VULVAE: Primary | ICD-10-CM

## 2024-04-03 NOTE — TELEPHONE ENCOUNTER
RECORDS STATUS - ALL OTHER DIAGNOSIS      Action April 18, 2024 1:08 PM    Action Taken - Req is faxed to [facility] for records and any PAP/HPV labs reports.  - Called PN and [facility] to push images to  PACS.    April 19, 2024 11:47 AM:  - Receive records from [facility]. Faxed to HIM and emailed to NN.      Imaging Received  April 19, 2024 11:48 AM    Action: Images from [facility] and PN received and resolved to PACS.     RECORDS RECEIVED FROM:    DATE RECEIVED:    NOTES STATUS DETAILS   OFFICE NOTE [facility] provider [facility] 4/1/24: Dr. Nasrin Barajas   OFFICE NOTE from other specialist [facility]- [facility] 3/27/24: BRYN Sr CNM   MEDICATION LIST [facility]- [facility]    LABS     ANYTHING RELATED TO DIAGNOSIS CE- [facility] Most recent 3/22/24   IMAGING (NEED IMAGES & REPORT)     CT SCANS Img req from [facility]/PN) [facility]:  2/22/24: CT Abd Pel    PN:  9/21/23: CT Abd Pel

## 2024-04-03 NOTE — PROGRESS NOTES
New Patient Hematology / Oncology Nurse Navigator Note     Referral Date: 4/3/24    Referring provider:       Referring Clinic/Organization: Other - [facility]     Referred to: GynOnc    Requested provider (if applicable): Dr. Breen    Evaluation for : OBGYN requesting 2nd opinion from Dr. Breen on white skin changes not improving with clobetasol. Pt with h/o multiple myeloma       Clinical History (per Nurse review of records provided):    Office Visit with OBGYN:      Clinical Assessment / Barriers to Care (Per Nurse):  Pt lives in [place], [place]    Records Location: [facility]   Faxed - Media tab/Scanned     Records Needed:   Last PAP/HPV? Any past path results from OBGYN?     Additional testing needed prior to consult:   N/A    Referral updates and Plan:   OUTGOING CALL to pt:  Introduced my role as nurse navigator with [facility] Hematology/Oncology dept and that we have recd the referral to Dr. Breen from Dr. Barajas.  Pt confirms they are aware of the referral and ready to schedule. She is very anxious regarding this referral and unclear why she's being referred to GynOnc. She wants to confirm she does not have cancer.     Writer provided emotional support. Explained Dr. Breen is a specialist in pre-cancer and cancer. Explained Dr. Barajas would like her input as an expert in the area so she can weigh in on pt's concerns and whether a biopsy or further work-up is needed given pt's ongoing issues and co-morbidities. Patient reports understanding and would like to meet with Dr. Breen for her input. She was thankful for the further explanation and for the support. Provided patient with my direct contact number if further questions arise prior to her consult with Dr. Breen.   Provided contact information if future questions arise.     -Transferred pt to NPS line [phone] to schedule appt per scheduling instructions.      Tamy Acosta, BSN, RN, PHN, OCN  Hematology/Oncology Nurse Navigator  [facility]  [facility]  [phone]

## 2024-04-04 ENCOUNTER — TRANSFERRED RECORDS (OUTPATIENT)
Dept: HEALTH INFORMATION MANAGEMENT | Facility: CLINIC | Age: 69
End: 2024-04-04
Payer: MEDICARE

## 2024-04-23 NOTE — PROGRESS NOTES
Consult Note  [facility] Gynecologic Oncology HPV Clinic  [facility]      Referring provider/Gynecologist:    Nasrin Barajas MD  [address] [place] [id number]      Primary Care Provider:  WileyMaddie resendez  [address] [place] [id number]       Vulvar/vaginal specialist:    BRYN Sr, DEVON  [facility]   [place] [address] [phone] (Work)   [phone] (Fax)         Patient: Maddie Sparks  : 1955  Language: English   Pronouns: she/her/hers       Date of Visit: 2024       Reason for visit: Clinical diagnosis of lichen simplex chronicus vs lichen sclerosus. Periclitoral lesion.       History of Present Illness:  Maddie Sparks is a 68 year old patient referred to the [facility] Gynecologic Oncology HPV Clinic for evaluation of a periclitoral lesion. Medical history significant for multiple myeloma, on chronic intermittent therapy, and PGAD causing severe pelvic pain. History as follows:    *HPV vaccination status: Unvaccinated.    Cervical Cancer Screening History:  Regular screening (per patient report; results not available for review)  -Due next year per patient report; screening done with her gynecologist Dr. Barajas.       Vulvar History:  3/27/24: Consultation with BRYN Jackson, CNP for management of longstanding vulvar dermatosis, first lichen simplex chronicus, now lichen sclerosus (clinical diagnosis; no biopsy).   -Findings: Very very minimal exam - able to spread labia majora slightly to see labia minora - 50% reabsorbed. I did not touch clitoris but villa appears intact and not obviously coapted. I can see clitoral bulb but did not touch.   Qtip test positive of labium and at 5 & 7 o'clock of introitus.   Erythema of interlabial sulci and introitus.   -Plan: Triamcinolone 0.1% ointment 2x/day; lidocaine 5% ointment 3x/day prn; skin protection (see note for details).       Subjective:  Maddie SHIELDS  Clare presents to the Gyn Onc HPV Clinic today for a scheduled consultation, unaccompanied.  Maddie reports that in November she started having severe constipation, which caused severe anxiety. GI evaluation was normal.   She sees Dr. Barajas as her gynecologist. She has a clinical diagnosis of lichen simplex chronicus, exacerbated by frequent wiping due to interstitial cystitis. Dr. Barajas referred her to the vulvar vaginal clinic at [facility], and then referred Maddie to the gyn onc HPV clinic to evaluate a white spot on the clitoris.   She also has chronic pelvic pain syndromes, unrelated to this.   Maddie reports a lot of vulvar pain due to dryness. She reports that this has been better for the past few weeks. Unclear if this is due to the treatments prescribed by Adriana Mohan, or due to therapy for her anxiety.   She is concerned about an exam since this sets off the PGAD (pelvic pain).         Medical History:  Past Medical History:   Diagnosis Date    Hypothyroidism     Interstitial cystitis     Multiple myeloma (H)     multiple myloma         Surgical History:  Past Surgical History:   Procedure Laterality Date    COLONOSCOPY      COLONOSCOPY N/A 2022    Procedure: COLONOSCOPY;  Surgeon: Aga Ornelas MD;  Location:  GI          Gynecologic History:  Menstrual/Menopause status: Menopausal.   Hormone therapy/Contraception status: None.  History of STIS: None.  Current sexual activity status:  Not currently sexually active due to interstital cystitis.        Obstetric History:   OB History    Para Term  AB Living   0 0 0 0 0 0   SAB IAB Ectopic Multiple Live Births   0 0 0 0 0          Medications:   Current Outpatient Medications   Medication Sig Dispense Refill    acetaminophen (TYLENOL) 325 MG tablet Take 1,000 mg by mouth every 6 hours as needed for mild pain      acyclovir (ZOVIRAX) 400 MG tablet Take 400 mg by mouth every 12 hours      aspirin (ASA) 81 MG EC tablet Take  81 mg by mouth daily      atorvastatin (LIPITOR) 20 MG tablet Take 20 mg by mouth daily Alternating 20mg  then 40mg's every other day.      bisacodyl (DULCOLAX) 5 MG EC tablet Take 5 mg by mouth daily as needed for constipation      Bortezomib (VELCADE IJ) Inject as directed once a week      Cholecalciferol (VITAMIN D3) 75 MCG (3000 UT) TABS Take 75 mcg by mouth daily      clobetasol (TEMOVATE) 0.05 % external ointment Apply topically daily      clobetasol (TEMOVATE) 0.05 % external ointment Apply topically 2 times daily Apply to affected area on hands twice daily for no more than 14-21 days at a time. There after, use as needed for flares. 45 g 3    desvenlafaxine succinate (PRISTIQ) 25 MG 24 hr tablet 25 mg daily      docusate sodium (COLACE) 100 MG tablet Take 100 mg by mouth daily      fluocinolone acetonide (DERMA SMOOTHE/FS BODY) 0.01 % external oil Use 2ml on scalp 2x weekly after showering. Let sit on scalp for 4 hours and max of overnight Using cap is optional 118.8 mL 3    LANsoprazole (PREVACID) 30 MG DR capsule Take 30 mg by mouth every morning (before breakfast)      levothyroxine (SYNTHROID/LEVOTHROID) 100 MCG tablet Take  by mouth daily. Take Friday and Saturday        levothyroxine (SYNTHROID/LEVOTHROID) 88 MCG tablet Take 88 mcg by mouth daily Take Sunday thru Thursday      LORazepam (ATIVAN) 0.5 MG tablet Take 0.5 mg by mouth      losartan (COZAAR) 25 MG tablet Take 25 mg by mouth daily      nystatin (MYCOSTATIN) 744102 UNIT/ML suspension Take 500,000 Units by mouth      phenazopyridine (PYRIDIUM) 200 MG tablet Take 200 mg by mouth 3 times daily as needed for irritation      polyethylene glycol (MIRALAX) 17 g packet Take 1 packet by mouth 2 times daily      QUEtiapine (SEROQUEL) 25 MG tablet Take 3 tablets by mouth at bedtime      sodium fluoride dental gel (PREVIDENT) 1.1 % GEL topical gel Apply to affected area at bedtime      traZODone (DESYREL) 50 MG tablet 50 mg      trimethoprim (TRIMPEX) 100  "MG tablet Take 1 tablet by mouth daily      alum & mag hydroxide-simethicone (MAALOX) 200-200-20 MG/5ML SUSP suspension Take 30 mLs by mouth every 4 hours as needed for indigestion (Patient not taking: Reported on 2024)       No current facility-administered medications for this visit.          Allergies:   Allergies   Allergen Reactions    Sulfa Antibiotics Shortness Of Breath    Amoxicillin Difficulty breathing    Ampicillin Sodium Difficulty breathing    Cephalexin Unknown    Tramadol Nausea    Latex Rash    Nitrofurantoin Other (See Comments) and Rash          Social History:  Patient lives with her  Jayesh.   Work status: Retired. Activity: Activity limitations due to pain.   Advanced Directives: Yes. Desired Healthcare Power of :  Jayesh Sparks.  Social History     Tobacco Use    Smoking status: Former     Current packs/day: 0.00     Average packs/day: 1 pack/day for 40.0 years (40.0 ttl pk-yrs)     Types: Cigarettes     Start date: 1980     Quit date: 2020     Years since quittin.1    Smokeless tobacco: Never          Family History:  Family history significant for a second-degree paternal relative with colon cancer.   No known family history of breast, ovarian, uterine, urothelial/renal, prostate or pancreatic cancers.  No known family history of melanoma.  Family History   Problem Relation Age of Onset    Cancer Father         BCC    Skin Cancer No family hx of     Melanoma No family hx of          Physical Exam:   /86   Pulse 80   Temp 98.4  F (36.9  C) (Temporal)   Resp 16   Ht 1.556 m (5' 1.25\")   Wt 56.9 kg (125 lb 5.8 oz)   SpO2 99%   BMI 23.49 kg/m    Body mass index is 23.49 kg/m .     General Appearance: healthy and alert, no distress     Musculoskeletal: extremities non tender and without edema    Skin: no lesions or rashes     Neurological: no gross defects     Psychiatric: appropriate mood and affect. Anxious.                             "   Genitourinary: External genitalia notable for resorption off the bilateral posterior labia. Anterior labia normal in appearance bilaterally. There is a 0.5 cm hypopigmented, thickened area of skin in the right periclitoral area. Not biopsied in clinic due to patient anxiety, concerns re: pain.   Verbal consent provided by the patient prior to pelvic exam.  Each step of the exam was verbalized throughout.  Present for the exam: VALERIA Tripathi.       Procedure Risk Statement:   The patient was counseled regarding risks, benefits and alternatives to vulvoscopy, possible biopsies.  Risks discussed include but not limited to:  Bleeding; infection; injury to adjacent organs; inadequate sample or false negative result.  The patient's questions were answered, and informed consent signed.       Procedure:   Omitted due to patient anxiety.         Periclitoral lesion c/f dVIN.       Laboratory Examination:  3/1/24 CBC: WBC 5.5, hemoglobin 14.9, platelets 176.  2/23/24 BMP: Creatinine 0.93. Electrolytes within normal limits.       Performance Status:  ECOG Grade 1 (due to pain).       Assessment:  Maddie Sparks is a 68 year old patient with a clinical diagnosis of lichen simplex chronicus vs lichen sclerosus currently with a right periclitoral lesion suspicious for dVIN. Biopsy not done today due to patient anxiety and concern regarding pain.     Medical history significant for multiple myeloma, and PGAD with uncontrolled pelvic pain.       Plan:   1) Vulvar lesion: I reviewed the exam findings with Maddie and concern for dVIN. Discussed risk of co-existing or future cancer in the setting of dVIN. Maddie is unable to tolerate a biopsy in clinic. Therefore, discussed recommendation for EUA, vulvar wide local excision and ablation to achieve a negative margin.    After extensive discussion of risks/benefits, including a lengthy discussion re: sedation and exacerbation of PGAD, plan for follow-up in 1 month  for re-evaluation  of the periclitoral lesion and to continue surgical discussion. Plan as follows:  -RTC in 1 month for re-evaluation and for discussion of surgical management (Pelvic exam under anesthesia, right anterior vulvar wide local excision with ultrasonic aspirator ablation of the medial margin to spare the clitoris; plan to heal by secondary intention due to Maddie's concerns re: stitch irritation).   If surgery scheduled, consider PACS evaluation so that she can discuss her anesthesia concerns with an anesthesiologist in advance of surgery.   -Continue clobetasol use in the interim. Recommend that she continuee management of the vulvar dystrophy with the vulvar vaginal clinic at [facility].   -She will call in the interim if symptoms worsen, or she decides to proceed with surgery.     2) Genetic risk factors assessed: Does not meet criteria for Genetics Referral.     3) Labs/tests ordered: None.    4) Health maintenance issues addressed today: None.    5) Code status: Full-code.     6) Prescriptions: None.           A total of 75 minutes was spent with the patient, 50 minutes of which were spent in counseling/treatment planning, 0 minutes of which were spent in procedure time; an additional 10 minutes was spent in chart review (including review of labs) and documentation.         Liberty Breen MD, MS, FACOG, FACS  4/25/2024  10:25 AM

## 2024-04-23 NOTE — PATIENT INSTRUCTIONS
SCHEDULING:  RTC in 1 month (MD, in-person, HPV clinic--either 5/16/24 at [facility] or 5/23/24 at [facility]).       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       PLAN:  1) Vulvar Lesion:   -Continue treatment with clobetaol.  -Recommend removal of the periclitoral lesion with ablation to achieve a 1 cm margin. Will further discuss at your followup visit.         Please call with any questions or concerns. [phone] (after-hours ask for gyn onc)         Liberty Breen MD, MS, FACOG, FACS  4/25/2024  10:16 AM

## 2024-04-25 ENCOUNTER — OFFICE VISIT (OUTPATIENT)
Dept: ONCOLOGY | Facility: CLINIC | Age: 69
End: 2024-04-25
Attending: OBSTETRICS & GYNECOLOGY
Payer: MEDICARE

## 2024-04-25 VITALS
BODY MASS INDEX: 23.67 KG/M2 | HEIGHT: 61 IN | RESPIRATION RATE: 16 BRPM | SYSTOLIC BLOOD PRESSURE: 130 MMHG | HEART RATE: 80 BPM | DIASTOLIC BLOOD PRESSURE: 86 MMHG | TEMPERATURE: 98.4 F | OXYGEN SATURATION: 99 % | WEIGHT: 125.36 LBS

## 2024-04-25 DIAGNOSIS — G89.29 CHRONIC PELVIC PAIN IN FEMALE: ICD-10-CM

## 2024-04-25 DIAGNOSIS — F41.9 ANXIETY: ICD-10-CM

## 2024-04-25 DIAGNOSIS — N90.89 VULVAR LESION: Primary | ICD-10-CM

## 2024-04-25 DIAGNOSIS — R10.2 CHRONIC PELVIC PAIN IN FEMALE: ICD-10-CM

## 2024-04-25 PROCEDURE — G0463 HOSPITAL OUTPT CLINIC VISIT: HCPCS | Performed by: OBSTETRICS & GYNECOLOGY

## 2024-04-25 PROCEDURE — 99205 OFFICE O/P NEW HI 60 MIN: CPT | Performed by: OBSTETRICS & GYNECOLOGY

## 2024-04-25 RX ORDER — LORAZEPAM 0.5 MG/1
0.5 TABLET ORAL
COMMUNITY
Start: 2024-03-26

## 2024-04-25 RX ORDER — NYSTATIN 100000/ML
500000 SUSPENSION, ORAL (FINAL DOSE FORM) ORAL
COMMUNITY
Start: 2024-04-11

## 2024-04-25 RX ORDER — QUETIAPINE FUMARATE 25 MG/1
3 TABLET, FILM COATED ORAL AT BEDTIME
COMMUNITY
Start: 2024-04-11

## 2024-04-25 RX ORDER — TRAZODONE HYDROCHLORIDE 50 MG/1
50 TABLET, FILM COATED ORAL
COMMUNITY
Start: 2024-04-12

## 2024-04-25 RX ORDER — DESVENLAFAXINE 25 MG/1
25 TABLET, EXTENDED RELEASE ORAL DAILY
COMMUNITY
Start: 2024-03-26

## 2024-04-25 ASSESSMENT — PAIN SCALES - GENERAL: PAINLEVEL: NO PAIN (0)

## 2024-04-25 NOTE — NURSING NOTE
"Oncology Rooming Note    April 25, 2024 8:44 AM   Maddie Sparks is a 68 year old female who presents for:    Chief Complaint   Patient presents with    Oncology Clinic Visit     Initial Vitals: /86   Pulse 80   Temp 98.4  F (36.9  C) (Temporal)   Resp 16   Ht 1.556 m (5' 1.25\")   Wt 56.9 kg (125 lb 5.8 oz)   SpO2 99%   BMI 23.49 kg/m   Estimated body mass index is 23.49 kg/m  as calculated from the following:    Height as of this encounter: 1.556 m (5' 1.25\").    Weight as of this encounter: 56.9 kg (125 lb 5.8 oz). Body surface area is 1.57 meters squared.  No Pain (0) Comment: Data Unavailable   No LMP recorded.  Allergies reviewed: Yes  Medications reviewed: Yes    Medications: Medication refills not needed today.  Pharmacy name entered into MST: [id number] [place] [id number] AT [place] [id number] & [facility]    Frailty Screening:   Is the patient here for a new oncology consult visit in cancer care? 1. Yes. Over the past month, have you experienced difficulty or required a caregiver to assist with:   1. Balance, walking or general mobility (including any falls)? NO  2. Completion of self-care tasks such as bathing, dressing, toileting, grooming/hygiene?  NO  3. Concentration or memory that affects your daily life?  NO       Clinical concerns: New Patient      Cynthia Morrison [facility]              "

## 2024-04-25 NOTE — LETTER
2024         RE: Maddie Sparks  [id number] [facility] [address]        Dear Colleague,    Thank you for referring your patient, Maddie Sparks, to the [facility]. Please see a copy of my visit note below.    Consult Note  [facility] Gynecologic Oncology HPV Clinic  [facility]      Referring provider/Gynecologist:    Nasrin Barajas MD  [address] [place] [id number]      Primary Care Provider:  Maddie Wiley  [address] [place] [address] [address]  [facility] [id number]       Vulvar/vaginal specialist:    BRYN Sr, DEVON  [facility] [place] [address] [phone] (Work)   [phone] (Fax)         Patient: Maddie Sparks  : 1955  Language: English   Pronouns: she/her/hers       Date of Visit: 2024       Reason for visit: Clinical diagnosis of lichen simplex chronicus vs lichen sclerosus. Periclitoral lesion.       History of Present Illness:  Maddie Sparks is a 68 year old patient referred to the [facility] Gynecologic Oncology HPV Clinic for evaluation of a periclitoral lesion. Medical history significant for multiple myeloma, on chronic intermittent therapy, and PGAD causing severe pelvic pain. History as follows:    *HPV vaccination status: Unvaccinated.    Cervical Cancer Screening History:  Regular screening (per patient report; results not available for review)  -Due next year per patient report; screening done with her gynecologist Dr. Barajas.       Vulvar History:  3/27/24: Consultation with BRYN Jackson, CNP for management of longstanding vulvar dermatosis, first lichen simplex chronicus, now lichen sclerosus (clinical diagnosis; no biopsy).   -Findings: Very very minimal exam - able to spread labia majora slightly to see labia minora - 50% reabsorbed. I did not touch clitoris but villa appears intact and not obviously coapted. I can see clitoral bulb but did not touch.    Qtip test positive of labium and at 5 & 7 o'clock of introitus.   Erythema of interlabial sulci and introitus.   -Plan: Triamcinolone 0.1% ointment 2x/day; lidocaine 5% ointment 3x/day prn; skin protection (see note for details).       Subjective:  Maddie Sparks presents to the Gyn Onc HPV Clinic today for a scheduled consultation, unaccompanied.  Maddie reports that in November she started having severe constipation, which caused severe anxiety. GI evaluation was normal.   She sees Dr. Barajas as her gynecologist. She has a clinical diagnosis of lichen simplex chronicus, exacerbated by frequent wiping due to interstitial cystitis. Dr. Barajas referred her to the vulvar vaginal clinic at [facility], and then referred Maddie to the gyn onc HPV clinic to evaluate a white spot on the clitoris.   She also has chronic pelvic pain syndromes, unrelated to this.   Maddie reports a lot of vulvar pain due to dryness. She reports that this has been better for the past few weeks. Unclear if this is due to the treatments prescribed by Adriana Mohan, or due to therapy for her anxiety.   She is concerned about an exam since this sets off the PGAD (pelvic pain).         Medical History:  Past Medical History:   Diagnosis Date     Hypothyroidism      Interstitial cystitis      Multiple myeloma (H)     multiple myloma         Surgical History:  Past Surgical History:   Procedure Laterality Date     COLONOSCOPY       COLONOSCOPY N/A 2022    Procedure: COLONOSCOPY;  Surgeon: Aga Ornelas MD;  Location:  GI          Gynecologic History:  Menstrual/Menopause status: Menopausal.   Hormone therapy/Contraception status: None.  History of STIS: None.  Current sexual activity status:  Not currently sexually active due to interstital cystitis.        Obstetric History:   OB History    Para Term  AB Living   0 0 0 0 0 0   SAB IAB Ectopic Multiple Live Births   0 0 0 0 0          Medications:   Current  Outpatient Medications   Medication Sig Dispense Refill     acetaminophen (TYLENOL) 325 MG tablet Take 1,000 mg by mouth every 6 hours as needed for mild pain       acyclovir (ZOVIRAX) 400 MG tablet Take 400 mg by mouth every 12 hours       aspirin (ASA) 81 MG EC tablet Take 81 mg by mouth daily       atorvastatin (LIPITOR) 20 MG tablet Take 20 mg by mouth daily Alternating 20mg  then 40mg's every other day.       bisacodyl (DULCOLAX) 5 MG EC tablet Take 5 mg by mouth daily as needed for constipation       Bortezomib (VELCADE IJ) Inject as directed once a week       Cholecalciferol (VITAMIN D3) 75 MCG (3000 UT) TABS Take 75 mcg by mouth daily       clobetasol (TEMOVATE) 0.05 % external ointment Apply topically daily       clobetasol (TEMOVATE) 0.05 % external ointment Apply topically 2 times daily Apply to affected area on hands twice daily for no more than 14-21 days at a time. There after, use as needed for flares. 45 g 3     desvenlafaxine succinate (PRISTIQ) 25 MG 24 hr tablet 25 mg daily       docusate sodium (COLACE) 100 MG tablet Take 100 mg by mouth daily       fluocinolone acetonide (DERMA SMOOTHE/FS BODY) 0.01 % external oil Use 2ml on scalp 2x weekly after showering. Let sit on scalp for 4 hours and max of overnight Using cap is optional 118.8 mL 3     LANsoprazole (PREVACID) 30 MG DR capsule Take 30 mg by mouth every morning (before breakfast)       levothyroxine (SYNTHROID/LEVOTHROID) 100 MCG tablet Take  by mouth daily. Take Friday and Saturday         levothyroxine (SYNTHROID/LEVOTHROID) 88 MCG tablet Take 88 mcg by mouth daily Take Sunday thru Thursday       LORazepam (ATIVAN) 0.5 MG tablet Take 0.5 mg by mouth       losartan (COZAAR) 25 MG tablet Take 25 mg by mouth daily       nystatin (MYCOSTATIN) 485199 UNIT/ML suspension Take 500,000 Units by mouth       phenazopyridine (PYRIDIUM) 200 MG tablet Take 200 mg by mouth 3 times daily as needed for irritation       polyethylene glycol (MIRALAX) 17 g  "packet Take 1 packet by mouth 2 times daily       QUEtiapine (SEROQUEL) 25 MG tablet Take 3 tablets by mouth at bedtime       sodium fluoride dental gel (PREVIDENT) 1.1 % GEL topical gel Apply to affected area at bedtime       traZODone (DESYREL) 50 MG tablet 50 mg       trimethoprim (TRIMPEX) 100 MG tablet Take 1 tablet by mouth daily       alum & mag hydroxide-simethicone (MAALOX) 200-200-20 MG/5ML SUSP suspension Take 30 mLs by mouth every 4 hours as needed for indigestion (Patient not taking: Reported on 2024)       No current facility-administered medications for this visit.          Allergies:   Allergies   Allergen Reactions     Sulfa Antibiotics Shortness Of Breath     Amoxicillin Difficulty breathing     Ampicillin Sodium Difficulty breathing     Cephalexin Unknown     Tramadol Nausea     Latex Rash     Nitrofurantoin Other (See Comments) and Rash          Social History:  Patient lives with her  Jayesh.   Work status: Retired. Activity: Activity limitations due to pain.   Advanced Directives: Yes. Desired Healthcare Power of :  Jayesh Sparks.  Social History     Tobacco Use     Smoking status: Former     Current packs/day: 0.00     Average packs/day: 1 pack/day for 40.0 years (40.0 ttl pk-yrs)     Types: Cigarettes     Start date: 1980     Quit date: 2020     Years since quittin.1     Smokeless tobacco: Never          Family History:  Family history significant for a second-degree paternal relative with colon cancer.   No known family history of breast, ovarian, uterine, urothelial/renal, prostate or pancreatic cancers.  No known family history of melanoma.  Family History   Problem Relation Age of Onset     Cancer Father         BCC     Skin Cancer No family hx of      Melanoma No family hx of          Physical Exam:   /86   Pulse 80   Temp 98.4  F (36.9  C) (Temporal)   Resp 16   Ht 1.556 m (5' 1.25\")   Wt 56.9 kg (125 lb 5.8 oz)   SpO2 99%   BMI 23.49 kg/m  "   Body mass index is 23.49 kg/m .     General Appearance: healthy and alert, no distress     Musculoskeletal: extremities non tender and without edema    Skin: no lesions or rashes     Neurological: no gross defects     Psychiatric: appropriate mood and affect. Anxious.                               Genitourinary: External genitalia notable for resorption off the bilateral posterior labia. Anterior labia normal in appearance bilaterally. There is a 0.5 cm hypopigmented, thickened area of skin in the right periclitoral area. Not biopsied in clinic due to patient anxiety, concerns re: pain.   Verbal consent provided by the patient prior to pelvic exam.  Each step of the exam was verbalized throughout.  Present for the exam: VALERIA Tripathi.       Procedure Risk Statement:   The patient was counseled regarding risks, benefits and alternatives to vulvoscopy, possible biopsies.  Risks discussed include but not limited to:  Bleeding; infection; injury to adjacent organs; inadequate sample or false negative result.  The patient's questions were answered, and informed consent signed.       Procedure:   Omitted due to patient anxiety.         Periclitoral lesion c/f dVIN.       Laboratory Examination:  3/1/24 CBC: WBC 5.5, hemoglobin 14.9, platelets 176.  2/23/24 BMP: Creatinine 0.93. Electrolytes within normal limits.       Performance Status:  ECOG Grade 1 (due to pain).       Assessment:  Maddie Sparks is a 68 year old patient with a clinical diagnosis of lichen simplex chronicus vs lichen sclerosus currently with a right periclitoral lesion suspicious for dVIN. Biopsy not done today due to patient anxiety and concern regarding pain.     Medical history significant for multiple myeloma, and PGAD with uncontrolled pelvic pain.       Plan:   1) Vulvar lesion: I reviewed the exam findings with Maddie and concern for dVIN. Discussed risk of co-existing or future cancer in the setting of dVIN. Maddie is unable to tolerate a  biopsy in clinic. Therefore, discussed recommendation for EUA, vulvar wide local excision and ablation to achieve a negative margin.    After extensive discussion of risks/benefits, including a lengthy discussion re: sedation and exacerbation of PGAD, plan for follow-up in 1 month  for re-evaluation of the periclitoral lesion and to continue surgical discussion. Plan as follows:  -RTC in 1 month for re-evaluation and for discussion of surgical management (Pelvic exam under anesthesia, right anterior vulvar wide local excision with ultrasonic aspirator ablation of the medial margin to spare the clitoris; plan to heal by secondary intention due to Maddie's concerns re: stitch irritation).   If surgery scheduled, consider PACS evaluation so that she can discuss her anesthesia concerns with an anesthesiologist in advance of surgery.   -Continue clobetasol use in the interim. Recommend that she continuee management of the vulvar dystrophy with the vulvar vaginal clinic at [facility].   -She will call in the interim if symptoms worsen, or she decides to proceed with surgery.     2) Genetic risk factors assessed: Does not meet criteria for Genetics Referral.     3) Labs/tests ordered: None.    4) Health maintenance issues addressed today: None.    5) Code status: Full-code.     6) Prescriptions: None.           A total of 75 minutes was spent with the patient, 50 minutes of which were spent in counseling/treatment planning, 0 minutes of which were spent in procedure time; an additional 10 minutes was spent in chart review (including review of labs) and documentation.         Liberty Breen MD, MS, FACOG, FACS  4/25/2024  10:25 AM            Again, thank you for allowing me to participate in the care of your patient.        Sincerely,        Liberty Breen MD

## 2024-04-25 NOTE — Clinical Note
Hi Dr. Barajas Thank you for referring Maddie Sparks to the gyn onc HPV clinic for evaluation of a periclitoral lesion. On exam I am concerned this could be dVIN, but she did not think she could tolerate a clinic biopsy. I have recommended an EUA, vulvar wide local excision and ablation of the margin, and she is considering this option. I will follow-up with her in 1 month to re-evaluate the lesion and continue management discussion. Please contact me if you have any questions or concerns.  --saulo Breen MD, MS, FACOG, FACS 4/25/2024 12:59 PM

## 2024-05-13 ENCOUNTER — HOSPITAL ENCOUNTER (OUTPATIENT)
Dept: MAMMOGRAPHY | Facility: CLINIC | Age: 69
Discharge: HOME | End: 2024-05-13
Attending: INTERNAL MEDICINE | Admitting: INTERNAL MEDICINE
Payer: MEDICARE

## 2024-05-13 DIAGNOSIS — Z12.31 VISIT FOR SCREENING MAMMOGRAM: ICD-10-CM

## 2024-05-13 PROCEDURE — 77063 BREAST TOMOSYNTHESIS BI: CPT

## 2024-05-16 ENCOUNTER — LAB REQUISITION (OUTPATIENT)
Dept: LAB | Facility: CLINIC | Age: 69
End: 2024-05-16
Payer: MEDICARE

## 2024-05-16 DIAGNOSIS — B37.0 CANDIDAL STOMATITIS: ICD-10-CM

## 2024-05-16 PROCEDURE — 87102 FUNGUS ISOLATION CULTURE: CPT | Mod: ORL | Performed by: OTOLARYNGOLOGY

## 2024-05-20 LAB — BACTERIA SPEC CULT: NO GROWTH

## 2024-06-10 NOTE — PROGRESS NOTES
"[facility] Dermatology Note  Encounter Date: Jun 11, 2024  Office Visit     Dermatology Problem List:  1.   Lichen planopilaris with concomitant androgenetic alopecia.  - Current tx: fluocinolone 0.01% oil, ILK injections (6/13/23)  - LPPAI: 0.67 (01/23/2024); 2.5 (6/11/2024)  2. Pustular psoriasis primarily of hands  3. Nummular dermatitis.   4. Nevi on the right parietal scalp  5. Retention hyperkeratosis on scalp  6. Actinic keratosis s/p cryotherapy  7. Occular rosacea, treated by optometry  8. \"Precancer\" on back, removed by outside dermatologist.    9. Patch testing at [facility], indicating allergy to oak hawkins and octyl gallate  10. History of multiple myeloma.   ____________________________________________    Assessment & Plan:   #Androgenetic alopecia- stable. Pt reports shampooing once a week. Recommend to wash scalp more often throughout the week especially when sweating. Pt reports taking 3000 IU Vitamin D      #Lichen Tomball-Pilaris  - LPPAI score: 2.5 (increase due to spreading)  - Continue fluocinolone 0.01% oil on the entire scalp once per week.  - Labs today: Vitamin D, iron, ferritin, zinc  - Recommend low dose minoxidil (Minoxidil 2.5 mg- start taking 0.625 mg daily for 1 to 2 weeks,  If tolerating, increase to 1.25 mg daily     # History of multiple myeloma, currently receiving therapy      # Palmar Psoriasis   - Started roflumilast twice a day  - received samples from her general diermatologist      Procedures Performed:   None      Follow-up: 4-6 month(s) in-person, or earlier for new or changing lesions    Staff and Scribe:     Scribe Disclosure:   I, Miriam Silva, am serving as a scribe; to document services personally performed by Mary Campbell MD -based on data collection and the provider's statements to me.     Provider Disclosure:  I agree with above History, Review of Systems, Physical exam and Plan.  I have reviewed the content of the documentation and have " edited it as needed. I have personally performed the services documented here and the documentation accurately represents those services and the decisions I have made.      Electronically signed by:  Mary Campbell MD  Professor   Department of Dermatology  [facility]     ____________________________________________    CC: Hair Loss (Patient is here for hair loss, hair loss is losing, went through a very stressful situation with health, areas of  concern the sides and top)    HPI:  Ms. Maddie Sparks is a 68 year old female who presents as a return patient for follow-up of hair loss, diagnosed as  lichen plano-pilaris.   .   - Last seen in-clinic on 1/23/2024  - Shedding or thinning, or both: thinning  - Current tx: fluocinolone oil  yes Any new medications, supplements, or products? (please list below)     mild Scalp pain   no Scalp burning   mild Scalp itching    no Eyebrow changes    yes Eyelash changes   no Beard changes    Yes-arms Other body hair changes    no Nail changes    no Additional symptoms? (please list below)     - Overall course: She is concerned about the areas on the sides and the top of the scalp. She reports, that she had some health problems in December. She was experiencing some abdomen pain, was started  on lorazepam for her anxiety  She had discontinue her chemo in January due to abdominal pain. She feels overall her hair is falling out all over.    Patient is otherwise feeling well, in usual state of health, and has no additional skin concerns today.       Labs:  None reviewed.    Physical Exam:  Vitals: /71   Pulse 76   SpO2 98%   GEN: Well developed, well-nourished, in no acute distress, in a pleasant mood.    SKIN: Scalp,face and hand exam was performed.  - skin of scalp is freely moveable  - fine fibers on temporal scalp  - decreased density on the arms  - no diffuse erythema   - no perifollicular erythema  - mild perifollicular scale   - scaling of the scalp   -  negative hair pull test   - upper eyelash shorter and lower eyelashes patchy  - normal eyebrow density  - no nail pitting or dystrophy   - no scalp folliculitis/pustules   - No other lesions of concern on areas examined.       Medications:  Current Outpatient Medications   Medication Sig Dispense Refill    acetaminophen (TYLENOL) 325 MG tablet Take 1,000 mg by mouth every 6 hours as needed for mild pain      acyclovir (ZOVIRAX) 400 MG tablet Take 400 mg by mouth every 12 hours      aspirin (ASA) 81 MG EC tablet Take 81 mg by mouth daily      atorvastatin (LIPITOR) 20 MG tablet Take 20 mg by mouth daily Alternating 20mg  then 40mg's every other day.      bisacodyl (DULCOLAX) 5 MG EC tablet Take 5 mg by mouth daily as needed for constipation      Bortezomib (VELCADE IJ) Inject as directed once a week      Cholecalciferol (VITAMIN D3) 75 MCG (3000 UT) TABS Take 75 mcg by mouth daily      clobetasol (TEMOVATE) 0.05 % external ointment Apply topically daily      clobetasol (TEMOVATE) 0.05 % external ointment Apply topically 2 times daily Apply to affected area on hands twice daily for no more than 14-21 days at a time. There after, use as needed for flares. 45 g 3    desvenlafaxine succinate (PRISTIQ) 25 MG 24 hr tablet 25 mg daily      docusate sodium (COLACE) 100 MG tablet Take 100 mg by mouth daily      fluocinolone acetonide (DERMA SMOOTHE/FS BODY) 0.01 % external oil Use 2ml on scalp 2x weekly after showering. Let sit on scalp for 4 hours and max of overnight Using cap is optional 118.8 mL 3    LANsoprazole (PREVACID) 30 MG DR capsule Take 30 mg by mouth every morning (before breakfast)      levothyroxine (SYNTHROID/LEVOTHROID) 100 MCG tablet Take  by mouth daily. Take Friday and Saturday        levothyroxine (SYNTHROID/LEVOTHROID) 88 MCG tablet Take 88 mcg by mouth daily Take Sunday thru Thursday      LORazepam (ATIVAN) 0.5 MG tablet Take 0.5 mg by mouth      losartan (COZAAR) 25 MG tablet Take 25 mg by mouth daily       nystatin (MYCOSTATIN) 685814 UNIT/ML suspension Take 500,000 Units by mouth      phenazopyridine (PYRIDIUM) 200 MG tablet Take 200 mg by mouth 3 times daily as needed for irritation      polyethylene glycol (MIRALAX) 17 g packet Take 1 packet by mouth 2 times daily      QUEtiapine (SEROQUEL) 25 MG tablet Take 3 tablets by mouth at bedtime      sodium fluoride dental gel (PREVIDENT) 1.1 % GEL topical gel Apply to affected area at bedtime      traZODone (DESYREL) 50 MG tablet 50 mg      trimethoprim (TRIMPEX) 100 MG tablet Take 1 tablet by mouth daily      alum & mag hydroxide-simethicone (MAALOX) 200-200-20 MG/5ML SUSP suspension Take 30 mLs by mouth every 4 hours as needed for indigestion (Patient not taking: Reported on 4/25/2024)       No current facility-administered medications for this visit.      Past Medical History:   Patient Active Problem List   Diagnosis    Seborrheic dermatitis    Acne    Alopecia    Chronic dermatitis of hands    Lichen plano-pilaris    Actinic keratosis    Inflamed seborrheic keratosis    Vulvar lesion     Past Medical History:   Diagnosis Date    Hypothyroidism     Interstitial cystitis     Multiple myeloma (H)     multiple myloma       CC No referring provider defined for this encounter. on close of this encounter.

## 2024-06-11 ENCOUNTER — OFFICE VISIT (OUTPATIENT)
Dept: DERMATOLOGY | Facility: CLINIC | Age: 69
End: 2024-06-11
Payer: MEDICARE

## 2024-06-11 VITALS — DIASTOLIC BLOOD PRESSURE: 71 MMHG | SYSTOLIC BLOOD PRESSURE: 102 MMHG | OXYGEN SATURATION: 98 % | HEART RATE: 76 BPM

## 2024-06-11 DIAGNOSIS — L40.3 PSORIASIS PALMARIS: ICD-10-CM

## 2024-06-11 DIAGNOSIS — Z85.79 HISTORY OF MULTIPLE MYELOMA: ICD-10-CM

## 2024-06-11 DIAGNOSIS — L30.9 DERMATITIS: ICD-10-CM

## 2024-06-11 DIAGNOSIS — L30.0 NUMMULAR ECZEMA: ICD-10-CM

## 2024-06-11 DIAGNOSIS — L66.10 LICHEN PLANO-PILARIS: Primary | ICD-10-CM

## 2024-06-11 LAB
FERRITIN SERPL-MCNC: 27 NG/ML (ref 11–328)
IRON BINDING CAPACITY (ROCHE): 381 UG/DL (ref 240–430)
IRON SATN MFR SERPL: 11 % (ref 15–46)
IRON SERPL-MCNC: 42 UG/DL (ref 37–145)
VIT D+METAB SERPL-MCNC: 40 NG/ML (ref 20–50)

## 2024-06-11 PROCEDURE — 82306 VITAMIN D 25 HYDROXY: CPT | Performed by: DERMATOLOGY

## 2024-06-11 PROCEDURE — 99214 OFFICE O/P EST MOD 30 MIN: CPT | Performed by: DERMATOLOGY

## 2024-06-11 PROCEDURE — 83540 ASSAY OF IRON: CPT | Performed by: DERMATOLOGY

## 2024-06-11 PROCEDURE — 83550 IRON BINDING TEST: CPT | Performed by: DERMATOLOGY

## 2024-06-11 PROCEDURE — 84630 ASSAY OF ZINC: CPT | Mod: 90 | Performed by: DERMATOLOGY

## 2024-06-11 PROCEDURE — 82728 ASSAY OF FERRITIN: CPT | Performed by: DERMATOLOGY

## 2024-06-11 PROCEDURE — 36415 COLL VENOUS BLD VENIPUNCTURE: CPT | Performed by: DERMATOLOGY

## 2024-06-11 PROCEDURE — 99000 SPECIMEN HANDLING OFFICE-LAB: CPT | Performed by: DERMATOLOGY

## 2024-06-11 NOTE — NURSING NOTE
Maddie Sparks's goals for this visit include:   Chief Complaint   Patient presents with    Hair Loss     Patient is here for hair loss, hair loss is losing, went through a very stressful situation with health, areas of  concern the sides and top       She requests these members of her care team be copied on today's visit information:     PCP: Maddie Wiley    Referring Provider:  Referred Self, MD  No address on file    /71   Pulse 76   SpO2 98%     Do you need any medication refills at today's visit?       June Vargas EMT

## 2024-06-11 NOTE — LETTER
"6/11/2024      Maddie Sparks  [address] [facility] [id number]      Dear Colleague,    Thank you for referring your patient, Maddie Sparks, to the [facility]. Please see a copy of my visit note below.    [facility] Dermatology Note  Encounter Date: Jun 11, 2024  Office Visit     Dermatology Problem List:  1.   Lichen planopilaris with concomitant androgenetic alopecia.  - Current tx: fluocinolone 0.01% oil, ILK injections (6/13/23)  - LPPAI: 0.67 (01/23/2024); 2.5 (6/11/2024)  2. Pustular psoriasis primarily of hands  3. Nummular dermatitis.   4. Nevi on the right parietal scalp  5. Retention hyperkeratosis on scalp  6. Actinic keratosis s/p cryotherapy  7. Occular rosacea, treated by optometry  8. \"Precancer\" on back, removed by outside dermatologist.    9. [place] [facility] allergy to oak hawkins and octyl gallate  10. History of multiple myeloma.   ____________________________________________    Assessment & Plan:   #Androgenetic alopecia- stable. Pt reports shampooing once a week. Recommend to wash scalp more often throughout the week especially when sweating. Pt reports taking 3000 IU Vitamin D      #Lichen Peck-Pilaris  - LPPAI score: 2.5 (increase due to spreading)  - Continue fluocinolone 0.01% oil on the entire scalp once per week.  - Labs today: Vitamin D, iron, ferritin, zinc  - Recommend low dose minoxidil (Minoxidil 2.5 mg- start taking 0.625 mg daily for 1 to 2 weeks,  If tolerating, increase to 1.25 mg daily     # History of multiple myeloma, currently receiving therapy      # Palmar Psoriasis   - Started roflumilast twice a day  - received samples from her general diermatologist      Procedures Performed:   None      Follow-up: 4-6 month(s) in-person, or earlier for new or changing lesions    Staff and Scribe:     Scribe Disclosure:   Miriam RUEDA, am serving as a scribe; to document services personally performed by Mary" Serafin Campbell MD -based on data collection and the provider's statements to me.     Provider Disclosure:  I agree with above History, Review of Systems, Physical exam and Plan.  I have reviewed the content of the documentation and have edited it as needed. I have personally performed the services documented here and the documentation accurately represents those services and the decisions I have made.      Electronically signed by:  Mary Campbell MD  Professor   Department of Dermatology  [facility]     ____________________________________________    CC: Hair Loss (Patient is here for hair loss, hair loss is losing, went through a very stressful situation with health, areas of  concern the sides and top)    HPI:  Ms. Maddie Sparks is a 68 year old female who presents as a return patient for follow-up of hair loss, diagnosed as  lichen plano-pilaris.   .   - Last seen in-clinic on 1/23/2024  - Shedding or thinning, or both: thinning  - Current tx: fluocinolone oil  yes Any new medications, supplements, or products? (please list below)     mild Scalp pain   no Scalp burning   mild Scalp itching    no Eyebrow changes    yes Eyelash changes   no Beard changes    Yes-arms Other body hair changes    no Nail changes    no Additional symptoms? (please list below)     - Overall course: She is concerned about the areas on the sides and the top of the scalp. She reports, that she had some health problems in December. She was experiencing some abdomen pain, was started  on lorazepam for her anxiety  She had discontinue her chemo in January due to abdominal pain. She feels overall her hair is falling out all over.    Patient is otherwise feeling well, in usual state of health, and has no additional skin concerns today.       Labs:  None reviewed.    Physical Exam:  Vitals: /71   Pulse 76   SpO2 98%   GEN: Well developed, well-nourished, in no acute distress, in a pleasant mood.    SKIN:  Scalp,face and hand exam was performed.  - skin of scalp is freely moveable  - fine fibers on temporal scalp  - decreased density on the arms  - no diffuse erythema   - no perifollicular erythema  - mild perifollicular scale   - scaling of the scalp   - negative hair pull test   - upper eyelash shorter and lower eyelashes patchy  - normal eyebrow density  - no nail pitting or dystrophy   - no scalp folliculitis/pustules   - No other lesions of concern on areas examined.       Medications:  Current Outpatient Medications   Medication Sig Dispense Refill     acetaminophen (TYLENOL) 325 MG tablet Take 1,000 mg by mouth every 6 hours as needed for mild pain       acyclovir (ZOVIRAX) 400 MG tablet Take 400 mg by mouth every 12 hours       aspirin (ASA) 81 MG EC tablet Take 81 mg by mouth daily       atorvastatin (LIPITOR) 20 MG tablet Take 20 mg by mouth daily Alternating 20mg  then 40mg's every other day.       bisacodyl (DULCOLAX) 5 MG EC tablet Take 5 mg by mouth daily as needed for constipation       Bortezomib (VELCADE IJ) Inject as directed once a week       Cholecalciferol (VITAMIN D3) 75 MCG (3000 UT) TABS Take 75 mcg by mouth daily       clobetasol (TEMOVATE) 0.05 % external ointment Apply topically daily       clobetasol (TEMOVATE) 0.05 % external ointment Apply topically 2 times daily Apply to affected area on hands twice daily for no more than 14-21 days at a time. There after, use as needed for flares. 45 g 3     desvenlafaxine succinate (PRISTIQ) 25 MG 24 hr tablet 25 mg daily       docusate sodium (COLACE) 100 MG tablet Take 100 mg by mouth daily       fluocinolone acetonide (DERMA SMOOTHE/FS BODY) 0.01 % external oil Use 2ml on scalp 2x weekly after showering. Let sit on scalp for 4 hours and max of overnight Using cap is optional 118.8 mL 3     LANsoprazole (PREVACID) 30 MG DR capsule Take 30 mg by mouth every morning (before breakfast)       levothyroxine (SYNTHROID/LEVOTHROID) 100 MCG tablet Take  by  mouth daily. Take Friday and Saturday         levothyroxine (SYNTHROID/LEVOTHROID) 88 MCG tablet Take 88 mcg by mouth daily Take Sunday thru Thursday       LORazepam (ATIVAN) 0.5 MG tablet Take 0.5 mg by mouth       losartan (COZAAR) 25 MG tablet Take 25 mg by mouth daily       nystatin (MYCOSTATIN) 884277 UNIT/ML suspension Take 500,000 Units by mouth       phenazopyridine (PYRIDIUM) 200 MG tablet Take 200 mg by mouth 3 times daily as needed for irritation       polyethylene glycol (MIRALAX) 17 g packet Take 1 packet by mouth 2 times daily       QUEtiapine (SEROQUEL) 25 MG tablet Take 3 tablets by mouth at bedtime       sodium fluoride dental gel (PREVIDENT) 1.1 % GEL topical gel Apply to affected area at bedtime       traZODone (DESYREL) 50 MG tablet 50 mg       trimethoprim (TRIMPEX) 100 MG tablet Take 1 tablet by mouth daily       alum & mag hydroxide-simethicone (MAALOX) 200-200-20 MG/5ML SUSP suspension Take 30 mLs by mouth every 4 hours as needed for indigestion (Patient not taking: Reported on 4/25/2024)       No current facility-administered medications for this visit.      Past Medical History:   Patient Active Problem List   Diagnosis     Seborrheic dermatitis     Acne     Alopecia     Chronic dermatitis of hands     Lichen plano-pilaris     Actinic keratosis     Inflamed seborrheic keratosis     Vulvar lesion     Past Medical History:   Diagnosis Date     Hypothyroidism      Interstitial cystitis      Multiple myeloma (H)     multiple myloma       CC No referring provider defined for this encounter. on close of this encounter.       Again, thank you for allowing me to participate in the care of your patient.        Sincerely,        Mary Campbell MD

## 2024-06-12 LAB — ZINC SERPL-MCNC: 71.9 UG/DL

## 2024-08-08 ENCOUNTER — LAB REQUISITION (OUTPATIENT)
Dept: LAB | Facility: CLINIC | Age: 69
End: 2024-08-08
Payer: MEDICARE

## 2024-08-08 DIAGNOSIS — J02.9 ACUTE PHARYNGITIS, UNSPECIFIED: ICD-10-CM

## 2024-08-08 PROCEDURE — 87070 CULTURE OTHR SPECIMN AEROBIC: CPT | Mod: ORL | Performed by: OTOLARYNGOLOGY

## 2024-08-08 PROCEDURE — 87102 FUNGUS ISOLATION CULTURE: CPT | Mod: ORL | Performed by: OTOLARYNGOLOGY

## 2024-08-10 LAB — BACTERIA SPEC CULT: NORMAL

## 2024-09-05 LAB — BACTERIA SPEC CULT: NO GROWTH

## 2024-12-21 ENCOUNTER — HEALTH MAINTENANCE LETTER (OUTPATIENT)
Age: 69
End: 2024-12-21

## 2024-12-24 DIAGNOSIS — L30.0 NUMMULAR ECZEMA: ICD-10-CM

## 2024-12-24 DIAGNOSIS — L30.9 DERMATITIS: Primary | ICD-10-CM

## 2024-12-24 RX ORDER — BETAMETHASONE DIPROPIONATE 0.5 MG/G
OINTMENT, AUGMENTED TOPICAL
Qty: 45 G | Refills: 1 | Status: SHIPPED | OUTPATIENT
Start: 2024-12-24

## 2024-12-24 NOTE — PROGRESS NOTES
Received message that clobetasol ointment was not covered by insurance but betamethasone dipropionate is. Script sent for the ointment formulation as requested.     Mary Campbell MD

## 2025-01-28 ENCOUNTER — OFFICE VISIT (OUTPATIENT)
Dept: DERMATOLOGY | Facility: CLINIC | Age: 70
End: 2025-01-28
Payer: MEDICARE

## 2025-01-28 VITALS — OXYGEN SATURATION: 100 % | DIASTOLIC BLOOD PRESSURE: 72 MMHG | SYSTOLIC BLOOD PRESSURE: 104 MMHG | HEART RATE: 99 BPM

## 2025-01-28 DIAGNOSIS — L30.9 DERMATITIS: ICD-10-CM

## 2025-01-28 DIAGNOSIS — L65.9 LOSS OF HAIR: ICD-10-CM

## 2025-01-28 DIAGNOSIS — L66.10 LICHEN PLANO-PILARIS: Primary | ICD-10-CM

## 2025-01-28 DIAGNOSIS — L66.10 LICHEN PLANO-PILARIS: ICD-10-CM

## 2025-01-28 PROCEDURE — 99214 OFFICE O/P EST MOD 30 MIN: CPT | Performed by: DERMATOLOGY

## 2025-01-28 RX ORDER — TACROLIMUS 1 MG/G
OINTMENT TOPICAL 2 TIMES DAILY
Qty: 60 G | Refills: 3 | Status: SHIPPED | OUTPATIENT
Start: 2025-01-28 | End: 2025-01-28

## 2025-01-28 RX ORDER — TACROLIMUS 1 MG/G
OINTMENT TOPICAL
Qty: 60 G | Refills: 3 | Status: SHIPPED | OUTPATIENT
Start: 2025-01-28

## 2025-01-28 RX ORDER — FLUOCINOLONE ACETONIDE 0.1 MG/ML
SOLUTION TOPICAL
Qty: 90 ML | Refills: 3 | Status: SHIPPED | OUTPATIENT
Start: 2025-01-28

## 2025-01-28 RX ORDER — FLUOCINOLONE ACETONIDE 0.1 MG/ML
SOLUTION TOPICAL 2 TIMES DAILY
Qty: 90 ML | Refills: 3 | Status: SHIPPED | OUTPATIENT
Start: 2025-01-28 | End: 2025-01-28

## 2025-01-28 ASSESSMENT — PAIN SCALES - GENERAL: PAINLEVEL_OUTOF10: NO PAIN (0)

## 2025-01-28 NOTE — PROGRESS NOTES
"[facility] Dermatology Note  Encounter Date: Jan 28, 2025  Office Visit     Dermatology Problem List:  1.  Lichen planopilaris with concomitant androgenetic alopecia, in the background of multiple myeloma currently in remission  - Current tx: fluocinolone 0.01% solution, protopic   - ILK injections (6/13/23)  - LPPAI: 0.67 (01/23/2024); 2.5 (6/11/2024); 0.67(1/28/25)  - Hair Metrix: 1/28/25  2. Pustular psoriasis primarily of hands  3. Nummular dermatitis.   4. Nevi on the right parietal scalp  5. Retention hyperkeratosis on scalp  6. Actinic keratosis s/p cryotherapy  7. Occular rosacea, treated by optometry  8. \"Precancer\" on back, removed by outside dermatologist.    9. Patch testing at [facility], indicating allergy to oak hawkins and octyl gallate  10. History of multiple myeloma.  ____________________________________________    Assessment & Plan:     #Androgenetic alopecia- stable. Patient has significant amount of scale   #Lichen Sargent-Pilaris  - LPPAI score: 0.67 (last: 2.5 (6/11/24)   - start fluocinolone 0.01% solution: 2 mL on the entire scalp twice per week.  - Hair Metrix today     # History of multiple myeloma, currently receiving therapy        # Palmar Psoriasis  - continue roflumilast twice a day  (following with general dermatologist)  - start protopic to hands twice a day      Procedures Performed:     Hair Metrix: 1/28/25 (baseline)  - Frontal scalp: 35; loose scale, mild perifollicular scale  - Mid Scalp: 6; vellus fibers, perifollicular scale  - Vertex scalp: 65; perifollicular scale, loose scale  - Occipital scalp: 128; moderate perifollicular erythema  - R Temporal: 94; moderate scale  - L Temporal: 77; loose scale; erythema      Follow-up: 6 month(s) in-person, or earlier for new or changing lesions    Staff and Scribe:     Scribe Disclosure:   Miriam RUEDA, am serving as a scribe; to document services personally performed by Mary Campbell MD -based on data " collection and the provider's statements to me.     Provider Disclosure:  I agree with above History, Review of Systems, Physical exam and Plan.  I have reviewed the content of the documentation and have edited it as needed. I have personally performed the services documented here and the documentation accurately represents those services and the decisions I have made.      Electronically signed by:  Mary Campbell MD  Professor   Department of Dermatology  [facility]     ____________________________________________    CC: Hair Loss (Patient is here to follow up for her hair loss. She states that she has not had any changes. Patient also states that she does have cancer but is not currently on chemotherapy. Photos taken)    HPI:  Ms. Maddie Sparks is a 69 year old female who presents as a return patient for follow-up of hair loss, diagnosed as Androgenetic alopecia, LPP and most likely, hair thinning related to recent cancer diagnosis and treatment   - Last seen in-clinic/ on 6/11/24  - Shedding or thinning, or both: none  - Current tx: fluocinolone oil not using; oral minoxidil 2.5 mg not taking  no Any new medications, supplements, or products? (please list below)     no Scalp pain   no Scalp burning   no Scalp itching    thin Eyebrow changes    thin Eyelash changes   no Beard changes    no Other body hair changes    no Nail changes    no Additional symptoms? (please list below)     - Overall course: she reports, that she had an iron infusion last week due to low iron. Patient has been feeling so fatigued due to low iron. She has been experiencing some headaches. Patient feels that due to her low iron, her hair has not improved. She is receiving  eximer  laser treatments for about 1-2 months now.     Patient is otherwise feeling well, in usual state of health, and has no additional skin concerns today.     Labs:  Ha1c;  reviewed.  12/2024   Iron saturation: 8%  Ferritin 13.9%  Hemoglobin:  12.3%    Physical Exam:  Vitals: /72 (BP Location: Right arm, Patient Position: Chair, Cuff Size: Adult Regular)   Pulse 99   SpO2 100%   GEN: Well developed, well-nourished, in no acute distress, in a pleasant mood.    SKIN: Focused examination of  the hands, face and scalp was performed.  - improved hair density   - no diffuse erythema   - mild perifollicular erythema  - mild perifollicular scale   - no scaling of the scalp   - negative hair pull test   - normal eyelash density  - normal eyebrow density  - no nail pitting or dystrophy   - no scalp folliculitis/pustules   - No other lesions of concern on areas examined.     Medications:  Current Outpatient Medications   Medication Sig Dispense Refill    acetaminophen (TYLENOL) 325 MG tablet Take 1,000 mg by mouth every 6 hours as needed for mild pain      acyclovir (ZOVIRAX) 400 MG tablet Take 400 mg by mouth every 12 hours      atorvastatin (LIPITOR) 20 MG tablet Take 20 mg by mouth daily Alternating 20mg  then 40mg's every other day.      augmented betamethasone dipropionate (DIPROLENE-AF) 0.05 % external ointment Apply daily to twice daily as needed 45 g 1    bisacodyl (DULCOLAX) 5 MG EC tablet Take 5 mg by mouth daily as needed for constipation      Cholecalciferol (VITAMIN D3) 75 MCG (3000 UT) TABS Take 75 mcg by mouth daily      clobetasol (TEMOVATE) 0.05 % external ointment Apply topically 2 times daily Apply to affected area on hands twice daily for no more than 14-21 days at a time. There after, use as needed for flares. 45 g 3    desvenlafaxine succinate (PRISTIQ) 25 MG 24 hr tablet 25 mg daily      fluocinolone acetonide (DERMA SMOOTHE/FS BODY) 0.01 % external oil Use 2ml on scalp 2x weekly after showering. Let sit on scalp for 4 hours and max of overnight Using cap is optional 118.8 mL 3    LANsoprazole (PREVACID) 30 MG DR capsule Take 30 mg by mouth every morning (before breakfast)      levothyroxine (SYNTHROID/LEVOTHROID) 100 MCG tablet Take   by mouth daily. Take Friday and Saturday        levothyroxine (SYNTHROID/LEVOTHROID) 88 MCG tablet Take 88 mcg by mouth daily Take Sunday thru Thursday      LORazepam (ATIVAN) 0.5 MG tablet Take 0.5 mg by mouth      losartan (COZAAR) 25 MG tablet Take 25 mg by mouth daily      nystatin (MYCOSTATIN) 011577 UNIT/ML suspension Take 500,000 Units by mouth      phenazopyridine (PYRIDIUM) 200 MG tablet Take 200 mg by mouth 3 times daily as needed for irritation      polyethylene glycol (MIRALAX) 17 g packet Take 1 packet by mouth 2 times daily      QUEtiapine (SEROQUEL) 25 MG tablet Take 3 tablets by mouth at bedtime      sodium fluoride dental gel (PREVIDENT) 1.1 % GEL topical gel Apply to affected area at bedtime      trimethoprim (TRIMPEX) 100 MG tablet Take 1 tablet by mouth daily      alum & mag hydroxide-simethicone (MAALOX) 200-200-20 MG/5ML SUSP suspension Take 30 mLs by mouth every 4 hours as needed for indigestion (Patient not taking: Reported on 4/25/2024)      aspirin (ASA) 81 MG EC tablet Take 81 mg by mouth daily      Bortezomib (VELCADE IJ) Inject as directed once a week      clobetasol (TEMOVATE) 0.05 % external ointment Apply topically daily      docusate sodium (COLACE) 100 MG tablet Take 100 mg by mouth daily      traZODone (DESYREL) 50 MG tablet 50 mg       No current facility-administered medications for this visit.      Past Medical History:   Patient Active Problem List   Diagnosis    Seborrheic dermatitis    Acne    Alopecia    Chronic dermatitis of hands    Lichen plano-pilaris    Actinic keratosis    Inflamed seborrheic keratosis    Vulvar lesion     Past Medical History:   Diagnosis Date    Hypothyroidism     Interstitial cystitis     Multiple myeloma (H)     multiple myloma       CC Referred Self, MD  No address on file on close of this encounter.

## 2025-01-28 NOTE — LETTER
"1/28/2025      Maddie Sparks  [address] [address] [address]      Dear Colleague,    Thank you for referring your patient, Maddie Sparks, to the [facility]. Please see a copy of my visit note below.    [facility] Dermatology Note  Encounter Date: Jan 28, 2025  Office Visit     Dermatology Problem List:  1.  Lichen planopilaris with concomitant androgenetic alopecia, in the background of multiple myeloma currently in remission  - Current tx: fluocinolone 0.01% solution, protopic   - ILK injections (6/13/23)  - LPPAI: 0.67 (01/23/2024); 2.5 (6/11/2024); 0.67(1/28/25)  - Hair Metrix: 1/28/25  2. Pustular psoriasis primarily of hands  3. Nummular dermatitis.   4. Nevi on the right parietal scalp  5. Retention hyperkeratosis on scalp  6. Actinic keratosis s/p cryotherapy  7. Occular rosacea, treated by optometry  8. \"Precancer\" on back, removed by outside dermatologist.    9. Patch testing at [facility], indicating allergy to oak hawkins and octyl gallate  10. History of multiple myeloma.  ____________________________________________    Assessment & Plan:     #Androgenetic alopecia- stable. Patient has significant amount of scale   #Lichen Cleaton-Pilaris  - LPPAI score: 0.67 (last: 2.5 (6/11/24)   - start fluocinolone 0.01% solution: 2 mL on the entire scalp twice per week.  - Hair Metrix today     # History of multiple myeloma, currently receiving therapy        # Palmar Psoriasis  - continue roflumilast twice a day  (following with general dermatologist)  - start protopic to hands twice a day      Procedures Performed:     Hair Metrix: 1/28/25 (baseline)  - Frontal scalp: 35; loose scale, mild perifollicular scale  - Mid Scalp: 6; vellus fibers, perifollicular scale  - Vertex scalp: 65; perifollicular scale, loose scale  - Occipital scalp: 128; moderate perifollicular erythema  - R Temporal: 94; moderate scale  - L Temporal: 77; loose scale; erythema      Follow-up: " 6 month(s) in-person, or earlier for new or changing lesions    Staff and Scribe:     Scribe Disclosure:   I, Miriam Silva, am serving as a scribe; to document services personally performed by Mary Campbell MD -based on data collection and the provider's statements to me.     Provider Disclosure:  I agree with above History, Review of Systems, Physical exam and Plan.  I have reviewed the content of the documentation and have edited it as needed. I have personally performed the services documented here and the documentation accurately represents those services and the decisions I have made.      Electronically signed by:  Mary Campbell MD  Professor   Department of Dermatology  [facility]     ____________________________________________    CC: Hair Loss (Patient is here to follow up for her hair loss. She states that she has not had any changes. Patient also states that she does have cancer but is not currently on chemotherapy. Photos taken)    HPI:  Ms. Maddie Sparks is a 69 year old female who presents as a return patient for follow-up of hair loss, diagnosed as Androgenetic alopecia, LPP and most likely, hair thinning related to recent cancer diagnosis and treatment   - Last seen in-clinic/ on 6/11/24  - Shedding or thinning, or both: none  - Current tx: fluocinolone oil not using; oral minoxidil 2.5 mg not taking  no Any new medications, supplements, or products? (please list below)     no Scalp pain   no Scalp burning   no Scalp itching    thin Eyebrow changes    thin Eyelash changes   no Beard changes    no Other body hair changes    no Nail changes    no Additional symptoms? (please list below)     - Overall course: she reports, that she had an iron infusion last week due to low iron. Patient has been feeling so fatigued due to low iron. She has been experiencing some headaches. Patient feels that due to her low iron, her hair has not improved. She is receiving  eximer  laser  treatments for about 1-2 months now.     Patient is otherwise feeling well, in usual state of health, and has no additional skin concerns today.     Labs:  Ha1c;  reviewed.  12/2024   Iron saturation: 8%  Ferritin 13.9%  Hemoglobin: 12.3%    Physical Exam:  Vitals: /72 (BP Location: Right arm, Patient Position: Chair, Cuff Size: Adult Regular)   Pulse 99   SpO2 100%   GEN: Well developed, well-nourished, in no acute distress, in a pleasant mood.    SKIN: Focused examination of  the hands, face and scalp was performed.  - improved hair density   - no diffuse erythema   - mild perifollicular erythema  - mild perifollicular scale   - no scaling of the scalp   - negative hair pull test   - normal eyelash density  - normal eyebrow density  - no nail pitting or dystrophy   - no scalp folliculitis/pustules   - No other lesions of concern on areas examined.     Medications:  Current Outpatient Medications   Medication Sig Dispense Refill    acetaminophen (TYLENOL) 325 MG tablet Take 1,000 mg by mouth every 6 hours as needed for mild pain      acyclovir (ZOVIRAX) 400 MG tablet Take 400 mg by mouth every 12 hours      atorvastatin (LIPITOR) 20 MG tablet Take 20 mg by mouth daily Alternating 20mg  then 40mg's every other day.      augmented betamethasone dipropionate (DIPROLENE-AF) 0.05 % external ointment Apply daily to twice daily as needed 45 g 1    bisacodyl (DULCOLAX) 5 MG EC tablet Take 5 mg by mouth daily as needed for constipation      Cholecalciferol (VITAMIN D3) 75 MCG (3000 UT) TABS Take 75 mcg by mouth daily      clobetasol (TEMOVATE) 0.05 % external ointment Apply topically 2 times daily Apply to affected area on hands twice daily for no more than 14-21 days at a time. There after, use as needed for flares. 45 g 3    desvenlafaxine succinate (PRISTIQ) 25 MG 24 hr tablet 25 mg daily      fluocinolone acetonide (DERMA SMOOTHE/FS BODY) 0.01 % external oil Use 2ml on scalp 2x weekly after showering. Let sit on  scalp for 4 hours and max of overnight Using cap is optional 118.8 mL 3    LANsoprazole (PREVACID) 30 MG DR capsule Take 30 mg by mouth every morning (before breakfast)      levothyroxine (SYNTHROID/LEVOTHROID) 100 MCG tablet Take  by mouth daily. Take Friday and Saturday        levothyroxine (SYNTHROID/LEVOTHROID) 88 MCG tablet Take 88 mcg by mouth daily Take Sunday thru Thursday      LORazepam (ATIVAN) 0.5 MG tablet Take 0.5 mg by mouth      losartan (COZAAR) 25 MG tablet Take 25 mg by mouth daily      nystatin (MYCOSTATIN) 916791 UNIT/ML suspension Take 500,000 Units by mouth      phenazopyridine (PYRIDIUM) 200 MG tablet Take 200 mg by mouth 3 times daily as needed for irritation      polyethylene glycol (MIRALAX) 17 g packet Take 1 packet by mouth 2 times daily      QUEtiapine (SEROQUEL) 25 MG tablet Take 3 tablets by mouth at bedtime      sodium fluoride dental gel (PREVIDENT) 1.1 % GEL topical gel Apply to affected area at bedtime      trimethoprim (TRIMPEX) 100 MG tablet Take 1 tablet by mouth daily      alum & mag hydroxide-simethicone (MAALOX) 200-200-20 MG/5ML SUSP suspension Take 30 mLs by mouth every 4 hours as needed for indigestion (Patient not taking: Reported on 4/25/2024)      aspirin (ASA) 81 MG EC tablet Take 81 mg by mouth daily      Bortezomib (VELCADE IJ) Inject as directed once a week      clobetasol (TEMOVATE) 0.05 % external ointment Apply topically daily      docusate sodium (COLACE) 100 MG tablet Take 100 mg by mouth daily      traZODone (DESYREL) 50 MG tablet 50 mg       No current facility-administered medications for this visit.      Past Medical History:   Patient Active Problem List   Diagnosis    Seborrheic dermatitis    Acne    Alopecia    Chronic dermatitis of hands    Lichen plano-pilaris    Actinic keratosis    Inflamed seborrheic keratosis    Vulvar lesion     Past Medical History:   Diagnosis Date    Hypothyroidism     Interstitial cystitis     Multiple myeloma (H)     multiple  [place] Referred Self, MD  No address on file on close of this encounter.     HairMetrix Summary (1/28/25)    Frontal anterior (6.5 cm)    Mid scalp (12 cm)    Vertex (24 cm)    Occipital (30 cm)    Right temporal (7.5, 8.5 cm)    Left temporal (6, 7 cm)    Summary          Again, thank you for allowing me to participate in the care of your patient.      Sincerely,    Mary Campbell MD    Electronically signed

## 2025-01-28 NOTE — NURSING NOTE
@Maddie Sparks's goals for this visit include:   Chief Complaint   Patient presents with    Hair Loss     Patient is here to follow up for her hair loss. She states that she has not had any changes. Patient also states that she does have cancer but is not currently on chemotherapy. Photos taken       She requests these members of her care team be copied on today's visit information: NO    PCP: Maddie Wiley    Referring Provider:  Referred Self, MD  No address on file    /72 (BP Location: Right arm, Patient Position: Chair, Cuff Size: Adult Regular)   Pulse 99   SpO2 100%     Do you need any medication refills at today's visit? NO    Angelika Dowling, CMA

## 2025-01-28 NOTE — PROGRESS NOTES
HairMetrix Summary (1/28/25)    Frontal anterior (6.5 cm)    Mid scalp (12 cm)    Vertex (24 cm)    Occipital (30 cm)    Right temporal (7.5, 8.5 cm)    Left temporal (6, 7 cm)    Summary

## 2025-06-09 NOTE — NURSING NOTE
"Dermatology Rooming Note    Maddie Sparks's goals for this visit include:   Chief Complaint   Patient presents with     Hair Loss     Maddie comes to clinic today for Androgenetic alopecia with LPP. States \"it's worse.\"     Rosina Dainels, [facility]    "
09-Jun-2025 18:41

## 2025-07-01 ENCOUNTER — OFFICE VISIT (OUTPATIENT)
Dept: DERMATOLOGY | Facility: CLINIC | Age: 70
End: 2025-07-01
Payer: MEDICARE

## 2025-07-01 VITALS — DIASTOLIC BLOOD PRESSURE: 85 MMHG | HEART RATE: 97 BPM | OXYGEN SATURATION: 95 % | SYSTOLIC BLOOD PRESSURE: 124 MMHG

## 2025-07-01 DIAGNOSIS — I99.9 VASCULAR LESION: ICD-10-CM

## 2025-07-01 DIAGNOSIS — L66.10 LICHEN PLANO-PILARIS: Primary | ICD-10-CM

## 2025-07-01 DIAGNOSIS — L64.9 ANDROGENETIC ALOPECIA: ICD-10-CM

## 2025-07-01 DIAGNOSIS — L30.9 DERMATITIS: ICD-10-CM

## 2025-07-01 DIAGNOSIS — L40.3 PSORIASIS PALMARIS: ICD-10-CM

## 2025-07-01 NOTE — LETTER
"7/1/2025      Maddie Sparks  [address] [address] [address]      Dear Colleague,    Thank you for referring your patient, Maddie Sparks, to the [facility]. Please see a copy of my visit note below.    [facility] Dermatology Note  Encounter Date: Jul 1, 2025  Office Visit     Dermatology Problem List:  1.  Lichen planopilaris with concomitant androgenetic alopecia, in the background of multiple myeloma currently in remission  - Current tx: fluocinolone 0.01% solution, protopic, topical 6% gabapentin (pending provider approval), Cerave shampoo, Sarna lotion  - ILK injections (6/13/23)  - LPPAI: 0.67 (01/23/2024); 2.5 (6/11/2024); 0.67(1/28/25)  - Hair Metrix: 1/28/25  2. Pustular psoriasis primarily of hands  3. Nummular dermatitis.   4. Nevi on the right parietal scalp  5. Retention hyperkeratosis on scalp  6. Actinic keratosis s/p cryotherapy  7. Occular rosacea, treated by optometry  8. \"Precancer\" on back, removed by outside dermatologist.    9. Patch testing at [facility], indicating allergy to oak hawkins and octyl gallate  10. History of multiple myeloma..  ____________________________________________    Assessment & Plan:     # Androgenetic alopecia- stable, with significant amount of itching   #Lichen [place]-Pilaris  - discussed topical gabapentin in detail  - start topical 6% gabapentin- apply 2 mL daily-patient will consult primary prior to starting.  - start sarna lotion-apply to localized itching on the scalp daily as needed (will try for now, while awaiting approval for the gabapentin).  - continue  fluocinolone 0.01% solution: 2 mL on the entire scalp twice per week.  - sample given of cerave anti dandruff shampoo/conditioner     # History of multiple myeloma, currently receiving therapy        # Palmar Psoriasis, improved  - continue roflumilast twice a day  (following with general dermatologist)  - continue protopic to hands twice a day    # " Vascular lesion, upper left vertex region.  - Cryotherapy performed today (see procedure note(s) below).          Procedures Performed:   - Cryotherapy procedure note, location(s): upper left vertex region. After verbal consent and discussion of risks and benefits including, but not limited to, dyspigmentation/scar, blister, and pain, 1 lesion(s) was(were) treated with 1-2 mm freeze border for 1-2 cycles with liquid nitrogen. Post cryotherapy instructions were provided. We reviewed that cryotherapy may be successful but if not, laser therapy might be needed.    Follow-up: 6 week(s) virtually (telephone with photos), or earlier for new or changing lesions (discuss labs/cryotherapy treatment).  Follow-up: in 6 months in clinic    Staff and Scribe:     Scribe Disclosure:   By signing my name below, I, Miriam Ricardo, attest that this document has been prepared under the direction and in the presence of Mary Campbell MD      Electronically signed by: Miriam Silva 7/1/25    Provider Disclosure:   The documentation recorded by the scribe accurately reflects the services I personally performed and the decisions made by me.    Mary Campbell MD  Professor   Department of Dermatology  [facility]: Phone: [phone], Fax:[phone]  [facility]: Phone: [phone], Fax: [phone]       ____________________________________________    CC: Hair Loss ( hair loss follow up, AOC-been itching/)    HPI:  Ms. Maddie Sparks is a 69 year old female who presents as a return patient for follow-up of hair loss, diagnosed as Androgenetic alopecia  with LPP  - Last seen in-clinic on 1/28/25  - Shedding or thinning, or both: none  - Current tx: fluocinolone solution once  a week, reports she is not using  rofumilast   no Any new medications, supplements, or products? (please list below)     no Scalp pain   no  Scalp burning   moderate Scalp itching    no Eyebrow changes    no Eyelash changes   no Beard changes    no Other body hair changes    no Nail changes    no Additional symptoms? (please list below)     - Overall course: Scalp has been itchy. Her health has not been good, she has not been able to hear from her right ear. Her scalp was controlled until she was ill with a virus. Patient carries the diagnosis of multiple myeloma.    Patient is otherwise feeling well, in usual state of health, and has no additional skin concerns today.     Labs:  None reviewed.    Physical Exam:  Vitals: /85 (BP Location: Left arm, Patient Position: Sitting)   Pulse 97   SpO2 95%   GEN: Well developed, well-nourished, in no acute distress, in a pleasant mood.    SKIN: Focused examination of face, hands and scalp was performed.  - palm wrist area with some desquamation and mild erythema  - 1-2 cm vascular lesion with surrounding erythema and scale on face  Scalp exam  - no diffuse erythema   - no perifollicular erythema  - mild perifollicular scale   - no scaling of the scalp   - negative hair pull test   - stable eyelash density-continuous  - stable eyebrow density  - no nail pitting or dystrophy   - no scalp folliculitis/pustules   - No other lesions of concern on areas examined.       Medications:  Current Outpatient Medications   Medication Sig Dispense Refill     acetaminophen (TYLENOL) 325 MG tablet Take 1,000 mg by mouth every 6 hours as needed for mild pain       acyclovir (ZOVIRAX) 400 MG tablet Take 400 mg by mouth every 12 hours       atorvastatin (LIPITOR) 20 MG tablet Take 20 mg by mouth daily Alternating 20mg  then 40mg's every other day.       augmented betamethasone dipropionate (DIPROLENE-AF) 0.05 % external ointment Apply daily to twice daily as needed 45 g 1     Bortezomib (VELCADE IJ) Inject as directed once a week       Cholecalciferol (VITAMIN D3) 75 MCG (3000 UT) TABS Take 75 mcg by mouth daily        clobetasol (TEMOVATE) 0.05 % external ointment Apply topically 2 times daily Apply to affected area on hands twice daily for no more than 14-21 days at a time. There after, use as needed for flares. 45 g 3     desvenlafaxine succinate (PRISTIQ) 25 MG 24 hr tablet 25 mg daily       fluocinolone (SYNALAR) 0.01 % solution Apply 2ml twice a week to scalp 90 mL 3     fluocinolone acetonide (DERMA SMOOTHE/FS BODY) 0.01 % external oil Use 2ml on scalp 2x weekly after showering. Let sit on scalp for 4 hours and max of overnight Using cap is optional 118.8 mL 3     LANsoprazole (PREVACID) 30 MG DR capsule Take 30 mg by mouth every morning (before breakfast)       levothyroxine (SYNTHROID/LEVOTHROID) 100 MCG tablet Take  by mouth daily. Take Friday and Saturday         levothyroxine (SYNTHROID/LEVOTHROID) 88 MCG tablet Take 88 mcg by mouth daily Take Sunday thru Thursday       LORazepam (ATIVAN) 0.5 MG tablet Take 0.5 mg by mouth       losartan (COZAAR) 25 MG tablet Take 25 mg by mouth daily       nystatin (MYCOSTATIN) 378562 UNIT/ML suspension Take 500,000 Units by mouth       phenazopyridine (PYRIDIUM) 200 MG tablet Take 200 mg by mouth 3 times daily as needed for irritation       polyethylene glycol (MIRALAX) 17 g packet Take 1 packet by mouth 2 times daily       sodium fluoride dental gel (PREVIDENT) 1.1 % GEL topical gel Apply to affected area at bedtime       tacrolimus (PROTOPIC) 0.1 % external ointment Apply topically to bilateral hands twice daily 60 g 3     trimethoprim (TRIMPEX) 100 MG tablet Take 1 tablet by mouth daily       docusate sodium (COLACE) 100 MG tablet Take 100 mg by mouth daily       QUEtiapine (SEROQUEL) 25 MG tablet Take 3 tablets by mouth at bedtime       No current facility-administered medications for this visit.      Past Medical History:   Patient Active Problem List   Diagnosis     Seborrheic dermatitis     Acne     Alopecia     Chronic dermatitis of hands     Lichen plano-pilaris     Actinic  keratosis     Inflamed seborrheic keratosis     Vulvar lesion     Past Medical History:   Diagnosis Date     Hypothyroidism      Interstitial cystitis      Multiple myeloma (H)     multiple myloma       CC No referring provider defined for this encounter. on close of this encounter.      Again, thank you for allowing me to participate in the care of your patient.        Sincerely,        Mary Campbell MD    Electronically signed

## 2025-07-01 NOTE — PROGRESS NOTES
"[facility] Dermatology Note  Encounter Date: Jul 1, 2025  Office Visit     Dermatology Problem List:  1.  Lichen planopilaris with concomitant androgenetic alopecia, in the background of multiple myeloma currently in remission  - Current tx: fluocinolone 0.01% solution, protopic, topical 6% gabapentin (pending provider approval), Cerave shampoo, Sarna lotion  - ILK injections (6/13/23)  - LPPAI: 0.67 (01/23/2024); 2.5 (6/11/2024); 0.67(1/28/25)  - Hair Metrix: 1/28/25  2. Pustular psoriasis primarily of hands  3. Nummular dermatitis.   4. Nevi on the right parietal scalp  5. Retention hyperkeratosis on scalp  6. Actinic keratosis s/p cryotherapy  7. Occular rosacea, treated by optometry  8. \"Precancer\" on back, removed by outside dermatologist.    9. Patch testing at [facility], indicating allergy to oak hawkins and octyl gallate  10. History of multiple myeloma..  ____________________________________________    Assessment & Plan:     # Androgenetic alopecia- stable, with significant amount of itching   #Lichen [place]-Pilaris  - discussed topical gabapentin in detail  - start topical 6% gabapentin- apply 2 mL daily-patient will consult primary prior to starting.  - start sarna lotion-apply to localized itching on the scalp daily as needed (will try for now, while awaiting approval for the gabapentin).  - continue  fluocinolone 0.01% solution: 2 mL on the entire scalp twice per week.  - sample given of cerave anti dandruff shampoo/conditioner     # History of multiple myeloma, currently receiving therapy        # Palmar Psoriasis, improved  - continue roflumilast twice a day  (following with general dermatologist)  - continue protopic to hands twice a day    # Vascular lesion, upper left vertex region.  - Cryotherapy performed today (see procedure note(s) below).          Procedures Performed:   - Cryotherapy procedure note, location(s): upper left vertex region. After verbal consent and discussion of risks " and benefits including, but not limited to, dyspigmentation/scar, blister, and pain, 1 lesion(s) was(were) treated with 1-2 mm freeze border for 1-2 cycles with liquid nitrogen. Post cryotherapy instructions were provided. We reviewed that cryotherapy may be successful but if not, laser therapy might be needed.    Follow-up: 6 week(s) virtually (telephone with photos), or earlier for new or changing lesions (discuss labs/cryotherapy treatment).  Follow-up: in 6 months in clinic    Staff and Scribe:     Scribe Disclosure:   By signing my name below, I, Miriam Ricardo, attest that this document has been prepared under the direction and in the presence of Mary Campbell MD      Electronically signed by: Miriam Silva 7/1/25    Provider Disclosure:   The documentation recorded by the scribe accurately reflects the services I personally performed and the decisions made by me.    Mary Campbell MD  Professor   Department of Dermatology  [facility]: Phone: [phone], Fax:[phone]  [facility]: Phone: [phone], Fax: [phone]       ____________________________________________    CC: Hair Loss ( hair loss follow up, AOC-been itching/)    HPI:  Ms. Maddie Sparks is a 69 year old female who presents as a return patient for follow-up of hair loss, diagnosed as Androgenetic alopecia  with LPP  - Last seen in-clinic on 1/28/25  - Shedding or thinning, or both: none  - Current tx: fluocinolone solution once  a week, reports she is not using  rofumilast   no Any new medications, supplements, or products? (please list below)     no Scalp pain   no Scalp burning   moderate Scalp itching    no Eyebrow changes    no Eyelash changes   no Beard changes    no Other body hair changes    no Nail changes    no Additional symptoms? (please list below)     - Overall course: Scalp has been itchy. Her health  has not been good, she has not been able to hear from her right ear. Her scalp was controlled until she was ill with a virus. Patient carries the diagnosis of multiple myeloma.    Patient is otherwise feeling well, in usual state of health, and has no additional skin concerns today.     Labs:  None reviewed.    Physical Exam:  Vitals: /85 (BP Location: Left arm, Patient Position: Sitting)   Pulse 97   SpO2 95%   GEN: Well developed, well-nourished, in no acute distress, in a pleasant mood.    SKIN: Focused examination of face, hands and scalp was performed.  - palm wrist area with some desquamation and mild erythema  - 1-2 cm vascular lesion with surrounding erythema and scale on face  Scalp exam  - no diffuse erythema   - no perifollicular erythema  - mild perifollicular scale   - no scaling of the scalp   - negative hair pull test   - stable eyelash density-continuous  - stable eyebrow density  - no nail pitting or dystrophy   - no scalp folliculitis/pustules   - No other lesions of concern on areas examined.       Medications:  Current Outpatient Medications   Medication Sig Dispense Refill    acetaminophen (TYLENOL) 325 MG tablet Take 1,000 mg by mouth every 6 hours as needed for mild pain      acyclovir (ZOVIRAX) 400 MG tablet Take 400 mg by mouth every 12 hours      atorvastatin (LIPITOR) 20 MG tablet Take 20 mg by mouth daily Alternating 20mg  then 40mg's every other day.      augmented betamethasone dipropionate (DIPROLENE-AF) 0.05 % external ointment Apply daily to twice daily as needed 45 g 1    Bortezomib (VELCADE IJ) Inject as directed once a week      Cholecalciferol (VITAMIN D3) 75 MCG (3000 UT) TABS Take 75 mcg by mouth daily      clobetasol (TEMOVATE) 0.05 % external ointment Apply topically 2 times daily Apply to affected area on hands twice daily for no more than 14-21 days at a time. There after, use as needed for flares. 45 g 3    desvenlafaxine succinate (PRISTIQ) 25 MG 24 hr tablet 25  mg daily      fluocinolone (SYNALAR) 0.01 % solution Apply 2ml twice a week to scalp 90 mL 3    fluocinolone acetonide (DERMA SMOOTHE/FS BODY) 0.01 % external oil Use 2ml on scalp 2x weekly after showering. Let sit on scalp for 4 hours and max of overnight Using cap is optional 118.8 mL 3    LANsoprazole (PREVACID) 30 MG DR capsule Take 30 mg by mouth every morning (before breakfast)      levothyroxine (SYNTHROID/LEVOTHROID) 100 MCG tablet Take  by mouth daily. Take Friday and Saturday        levothyroxine (SYNTHROID/LEVOTHROID) 88 MCG tablet Take 88 mcg by mouth daily Take Sunday thru Thursday      LORazepam (ATIVAN) 0.5 MG tablet Take 0.5 mg by mouth      losartan (COZAAR) 25 MG tablet Take 25 mg by mouth daily      nystatin (MYCOSTATIN) 285048 UNIT/ML suspension Take 500,000 Units by mouth      phenazopyridine (PYRIDIUM) 200 MG tablet Take 200 mg by mouth 3 times daily as needed for irritation      polyethylene glycol (MIRALAX) 17 g packet Take 1 packet by mouth 2 times daily      sodium fluoride dental gel (PREVIDENT) 1.1 % GEL topical gel Apply to affected area at bedtime      tacrolimus (PROTOPIC) 0.1 % external ointment Apply topically to bilateral hands twice daily 60 g 3    trimethoprim (TRIMPEX) 100 MG tablet Take 1 tablet by mouth daily      docusate sodium (COLACE) 100 MG tablet Take 100 mg by mouth daily      QUEtiapine (SEROQUEL) 25 MG tablet Take 3 tablets by mouth at bedtime       No current facility-administered medications for this visit.      Past Medical History:   Patient Active Problem List   Diagnosis    Seborrheic dermatitis    Acne    Alopecia    Chronic dermatitis of hands    Lichen plano-pilaris    Actinic keratosis    Inflamed seborrheic keratosis    Vulvar lesion     Past Medical History:   Diagnosis Date    Hypothyroidism     Interstitial cystitis     Multiple myeloma (H)     multiple myloma       CC No referring provider defined for this encounter. on close of this encounter.

## 2025-07-01 NOTE — PATIENT INSTRUCTIONS
Sarna lotion-apply to localized itching on the scalp daily as needed  Check DHEAS  and testosterone free and total

## 2025-07-11 ENCOUNTER — MYC MEDICAL ADVICE (OUTPATIENT)
Dept: DERMATOLOGY | Facility: CLINIC | Age: 70
End: 2025-07-11
Payer: MEDICARE

## 2025-07-17 ENCOUNTER — MYC MEDICAL ADVICE (OUTPATIENT)
Dept: DERMATOLOGY | Facility: CLINIC | Age: 70
End: 2025-07-17
Payer: MEDICARE

## 2025-07-21 ENCOUNTER — HOSPITAL ENCOUNTER (OUTPATIENT)
Dept: MAMMOGRAPHY | Facility: CLINIC | Age: 70
Discharge: HOME | End: 2025-07-21
Attending: OBSTETRICS & GYNECOLOGY | Admitting: OBSTETRICS & GYNECOLOGY
Payer: MEDICARE

## 2025-07-21 DIAGNOSIS — Z12.31 VISIT FOR SCREENING MAMMOGRAM: ICD-10-CM

## 2025-07-21 PROCEDURE — 77067 SCR MAMMO BI INCL CAD: CPT

## 2025-07-30 ENCOUNTER — MYC MEDICAL ADVICE (OUTPATIENT)
Dept: DERMATOLOGY | Facility: CLINIC | Age: 70
End: 2025-07-30
Payer: MEDICARE

## 2025-07-31 NOTE — TELEPHONE ENCOUNTER
"Photo emailed 7/30/25    \"As you requested for my upcoming telephone visit with Dr. Campbell on Friday, August 1, please see photo below which was taken last Friday, July 25 of the purple lesion Dr. Campbell burned off my scalp on July 1, 2025.  As of today, July 30, the scab on top has now come off but the purple lesion remains and did not go away.  I do not have a photo of how it looks today but it looks the same as it did before she used the liquid nitrogen.\"      "

## 2025-08-01 ENCOUNTER — VIRTUAL VISIT (OUTPATIENT)
Dept: DERMATOLOGY | Facility: CLINIC | Age: 70
End: 2025-08-01
Payer: MEDICARE

## 2025-08-01 DIAGNOSIS — L30.9 DERMATITIS: ICD-10-CM

## 2025-08-01 DIAGNOSIS — L66.10 LICHEN PLANO-PILARIS: Primary | ICD-10-CM

## 2025-08-01 DIAGNOSIS — L65.9 LOSS OF HAIR: ICD-10-CM

## 2025-08-01 DIAGNOSIS — R79.89 ELEVATED TESTOSTERONE LEVEL IN FEMALE: ICD-10-CM

## 2025-08-01 RX ORDER — SODIUM CHLORIDE 0.65 %
1 AEROSOL, SPRAY (ML) NASAL DAILY PRN
COMMUNITY
Start: 2024-04-12

## 2025-08-01 RX ORDER — ATORVASTATIN CALCIUM 40 MG/1
40 TABLET, FILM COATED ORAL DAILY
COMMUNITY
Start: 2025-06-26

## 2025-08-01 RX ORDER — IPRATROPIUM BROMIDE 42 UG/1
2 SPRAY, METERED NASAL 3 TIMES DAILY
COMMUNITY
Start: 2024-06-30

## 2025-08-01 RX ORDER — MUPIROCIN 2 %
OINTMENT (GRAM) TOPICAL DAILY
COMMUNITY

## 2025-08-01 RX ORDER — TRIAMCINOLONE ACETONIDE 1 MG/G
OINTMENT TOPICAL 2 TIMES DAILY
COMMUNITY
Start: 2024-03-27

## 2025-08-01 RX ORDER — LIDOCAINE 50 MG/G
OINTMENT TOPICAL DAILY
COMMUNITY
Start: 2025-05-05

## 2025-08-01 RX ORDER — LIDOCAINE HYDROCHLORIDE 20 MG/ML
15 SOLUTION OROPHARYNGEAL
COMMUNITY

## 2025-08-01 RX ORDER — TAZAROTENE 0.5 MG/G
CREAM TOPICAL
COMMUNITY

## 2025-08-01 RX ORDER — OLOPATADINE HYDROCHLORIDE 1 MG/ML
1 SOLUTION OPHTHALMIC 2 TIMES DAILY
COMMUNITY

## 2025-08-01 RX ORDER — FLUOROMETHOLONE 1 MG/ML
1 SUSPENSION/ DROPS OPHTHALMIC 2 TIMES DAILY
COMMUNITY

## 2025-08-01 RX ORDER — TRAZODONE HYDROCHLORIDE 50 MG/1
50 TABLET ORAL AT BEDTIME
COMMUNITY

## 2025-08-01 RX ORDER — LUBIPROSTONE 0.02 MG/1
24 CAPSULE ORAL
COMMUNITY

## 2025-08-01 RX ORDER — TRETINOIN 0.25 MG/G
CREAM TOPICAL AT BEDTIME
COMMUNITY

## 2025-08-01 RX ORDER — PREDNISOLONE ACETATE 10 MG/ML
1-2 SUSPENSION/ DROPS OPHTHALMIC
COMMUNITY
Start: 2025-06-26

## 2025-08-01 RX ORDER — FOLIC ACID 1 MG/1
1000 TABLET ORAL DAILY
COMMUNITY
Start: 2025-01-09

## 2025-08-01 RX ORDER — AZELASTINE 1 MG/ML
2 SPRAY, METERED NASAL 2 TIMES DAILY
COMMUNITY
Start: 2025-05-22

## 2025-08-01 RX ORDER — TOBRAMYCIN AND DEXAMETHASONE 3; 1 MG/ML; MG/ML
1 SUSPENSION/ DROPS OPHTHALMIC
COMMUNITY

## 2025-08-01 RX ORDER — MINOXIDIL 2.5 MG/1
2.5 TABLET ORAL DAILY
COMMUNITY

## 2025-08-05 ENCOUNTER — MYC MEDICAL ADVICE (OUTPATIENT)
Dept: DERMATOLOGY | Facility: CLINIC | Age: 70
End: 2025-08-05
Payer: MEDICARE

## 2025-08-11 ENCOUNTER — PATIENT OUTREACH (OUTPATIENT)
Dept: CARE COORDINATION | Facility: CLINIC | Age: 70
End: 2025-08-11
Payer: MEDICARE

## 2025-08-13 ENCOUNTER — PATIENT OUTREACH (OUTPATIENT)
Dept: CARE COORDINATION | Facility: CLINIC | Age: 70
End: 2025-08-13
Payer: MEDICARE

## (undated) DEVICE — KIT ENDO TURNOVER/PROCEDURE W/CLEAN A SCOPE LINERS 103888

## (undated) DEVICE — TUBING OXYGEN CANNULA NASAL 7FT

## (undated) RX ORDER — FENTANYL CITRATE 0.05 MG/ML
INJECTION, SOLUTION INTRAMUSCULAR; INTRAVENOUS
Status: DISPENSED
Start: 2022-03-28

## (undated) RX ORDER — ONDANSETRON 2 MG/ML
INJECTION INTRAMUSCULAR; INTRAVENOUS
Status: DISPENSED
Start: 2022-03-28